# Patient Record
Sex: FEMALE | Race: WHITE | NOT HISPANIC OR LATINO | Employment: UNEMPLOYED | URBAN - NONMETROPOLITAN AREA
[De-identification: names, ages, dates, MRNs, and addresses within clinical notes are randomized per-mention and may not be internally consistent; named-entity substitution may affect disease eponyms.]

---

## 2019-12-16 ENCOUNTER — HISTORICAL (OUTPATIENT)
Dept: ADMINISTRATIVE | Facility: HOSPITAL | Age: 44
End: 2019-12-16

## 2020-09-11 ENCOUNTER — HISTORICAL (OUTPATIENT)
Dept: ADMINISTRATIVE | Facility: HOSPITAL | Age: 45
End: 2020-09-11

## 2020-10-02 ENCOUNTER — HISTORICAL (OUTPATIENT)
Dept: ADMINISTRATIVE | Facility: HOSPITAL | Age: 45
End: 2020-10-02

## 2021-12-01 LAB
ANION GAP SERPL CALC-SCNC: 1 MMOL/L (ref 2–13)
BUN SERPL-MCNC: 16 MG/DL (ref 7–20)
CALCIUM SERPL-MCNC: 9.8 MG/DL (ref 8.4–10.2)
CHLORIDE SERPL-SCNC: 103 MMOL/L (ref 94–110)
CO2 SERPL-SCNC: 32 MMOL/L (ref 21–32)
CREAT SERPL-MCNC: 0.63 MG/DL (ref 0.52–1.04)
CREAT/UREA NIT SERPL: 25.4 (ref 12–20)
EST CREAT CLEARANCE SER (OHS): 93.89 ML/MIN
GLUCOSE SERPL-MCNC: 87 MGM./DL (ref 70–115)
POTASSIUM SERPL-SCNC: 4.3 MMOL/L (ref 3.5–5.1)
SODIUM SERPL-SCNC: 136 MMOL/L (ref 135–145)

## 2022-04-10 ENCOUNTER — HISTORICAL (OUTPATIENT)
Dept: ADMINISTRATIVE | Facility: HOSPITAL | Age: 47
End: 2022-04-10

## 2022-04-28 VITALS
WEIGHT: 115.94 LBS | DIASTOLIC BLOOD PRESSURE: 72 MMHG | SYSTOLIC BLOOD PRESSURE: 124 MMHG | BODY MASS INDEX: 18.2 KG/M2 | OXYGEN SATURATION: 99 % | HEIGHT: 67 IN

## 2022-04-30 ENCOUNTER — HISTORICAL (OUTPATIENT)
Dept: ADMINISTRATIVE | Facility: HOSPITAL | Age: 47
End: 2022-04-30
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
## 2023-02-23 ENCOUNTER — TELEPHONE (OUTPATIENT)
Dept: FAMILY MEDICINE | Facility: CLINIC | Age: 48
End: 2023-02-23
Payer: MEDICAID

## 2023-02-23 DIAGNOSIS — G62.9 NEUROPATHY: Primary | ICD-10-CM

## 2023-02-23 RX ORDER — GABAPENTIN 300 MG/1
300 CAPSULE ORAL 3 TIMES DAILY
Qty: 90 CAPSULE | Refills: 3 | Status: SHIPPED | OUTPATIENT
Start: 2023-02-23 | End: 2024-02-15 | Stop reason: SDUPTHER

## 2023-02-23 RX ORDER — GABAPENTIN 300 MG/1
300 CAPSULE ORAL 3 TIMES DAILY
COMMUNITY
Start: 2022-11-28 | End: 2023-02-23 | Stop reason: SDUPTHER

## 2023-05-24 VITALS
WEIGHT: 115.31 LBS | HEART RATE: 86 BPM | TEMPERATURE: 98 F | BODY MASS INDEX: 18.1 KG/M2 | DIASTOLIC BLOOD PRESSURE: 60 MMHG | SYSTOLIC BLOOD PRESSURE: 110 MMHG | OXYGEN SATURATION: 94 % | HEIGHT: 67 IN

## 2023-05-24 RX ORDER — NAPROXEN 500 MG/1
500 TABLET ORAL 2 TIMES DAILY PRN
COMMUNITY
Start: 2022-05-31 | End: 2023-07-10 | Stop reason: SDUPTHER

## 2023-05-24 RX ORDER — ESCITALOPRAM OXALATE 10 MG/1
10 TABLET ORAL NIGHTLY
COMMUNITY
Start: 2022-12-30 | End: 2023-09-11 | Stop reason: SDUPTHER

## 2023-05-24 RX ORDER — BUSPIRONE HYDROCHLORIDE 5 MG/1
5 TABLET ORAL 2 TIMES DAILY
COMMUNITY
Start: 2022-12-30 | End: 2023-11-07

## 2023-05-24 RX ORDER — TIZANIDINE 4 MG/1
4 TABLET ORAL EVERY 8 HOURS PRN
COMMUNITY
Start: 2022-05-31 | End: 2023-09-11 | Stop reason: SDUPTHER

## 2023-06-20 VITALS
BODY MASS INDEX: 18.1 KG/M2 | OXYGEN SATURATION: 94 % | HEIGHT: 67 IN | TEMPERATURE: 98 F | HEART RATE: 86 BPM | WEIGHT: 115.31 LBS | DIASTOLIC BLOOD PRESSURE: 60 MMHG | SYSTOLIC BLOOD PRESSURE: 110 MMHG

## 2023-07-10 ENCOUNTER — TELEPHONE (OUTPATIENT)
Dept: FAMILY MEDICINE | Facility: CLINIC | Age: 48
End: 2023-07-10
Payer: MEDICAID

## 2023-07-10 DIAGNOSIS — R52 PAIN: Primary | ICD-10-CM

## 2023-07-10 RX ORDER — NAPROXEN 500 MG/1
500 TABLET ORAL 2 TIMES DAILY PRN
Qty: 60 TABLET | Refills: 0 | Status: SHIPPED | OUTPATIENT
Start: 2023-07-10 | End: 2023-08-09 | Stop reason: SDUPTHER

## 2023-07-18 ENCOUNTER — TELEPHONE (OUTPATIENT)
Dept: FAMILY MEDICINE | Facility: CLINIC | Age: 48
End: 2023-07-18
Payer: MEDICAID

## 2023-07-18 NOTE — TELEPHONE ENCOUNTER
----- Message from Josef He sent at 7/18/2023 11:05 AM CDT -----  Regarding: general  Pt was left a vm for labs to be done by Friday at the hospital, prior to appt.

## 2023-08-09 ENCOUNTER — TELEPHONE (OUTPATIENT)
Dept: FAMILY MEDICINE | Facility: CLINIC | Age: 48
End: 2023-08-09
Payer: MEDICAID

## 2023-08-09 DIAGNOSIS — R52 PAIN: ICD-10-CM

## 2023-08-09 RX ORDER — NAPROXEN 500 MG/1
500 TABLET ORAL 2 TIMES DAILY PRN
Qty: 60 TABLET | Refills: 0 | Status: SHIPPED | OUTPATIENT
Start: 2023-08-09 | End: 2023-09-11

## 2023-08-09 NOTE — TELEPHONE ENCOUNTER
----- Message from Antonette Vazquez sent at 8/9/2023  9:48 AM CDT -----  Regarding: refill  Naproxen 500 mg     [facility]         [phone]

## 2023-08-15 ENCOUNTER — TELEPHONE (OUTPATIENT)
Dept: FAMILY MEDICINE | Facility: CLINIC | Age: 48
End: 2023-08-15
Payer: MEDICAID

## 2023-08-15 VITALS
WEIGHT: 115.31 LBS | DIASTOLIC BLOOD PRESSURE: 60 MMHG | TEMPERATURE: 98 F | HEART RATE: 86 BPM | BODY MASS INDEX: 18.1 KG/M2 | OXYGEN SATURATION: 94 % | HEIGHT: 67 IN | SYSTOLIC BLOOD PRESSURE: 110 MMHG

## 2023-08-24 PROBLEM — Z90.710 HISTORY OF HYSTERECTOMY: Status: ACTIVE | Noted: 2023-08-24

## 2023-08-24 PROBLEM — M51.36 DEGENERATION OF INTERVERTEBRAL DISC OF LUMBAR REGION: Status: ACTIVE | Noted: 2023-08-24

## 2023-08-24 PROBLEM — F41.9 ANXIETY: Status: ACTIVE | Noted: 2023-08-24

## 2023-08-24 PROBLEM — N95.1 HOT FLASHES DUE TO MENOPAUSE: Status: ACTIVE | Noted: 2023-08-24

## 2023-08-24 PROBLEM — E78.00 ELEVATED LOW DENSITY LIPOPROTEIN (LDL) CHOLESTEROL LEVEL: Status: ACTIVE | Noted: 2023-08-24

## 2023-08-24 PROBLEM — M51.369 DEGENERATION OF INTERVERTEBRAL DISC OF LUMBAR REGION: Status: ACTIVE | Noted: 2023-08-24

## 2023-08-24 PROBLEM — G62.9 NEUROPATHY: Status: ACTIVE | Noted: 2023-08-24

## 2023-08-24 PROBLEM — Z00.01 ABNORMAL PHYSICAL EVALUATION: Status: ACTIVE | Noted: 2023-08-24

## 2023-09-11 ENCOUNTER — LAB VISIT (OUTPATIENT)
Dept: LAB | Facility: HOSPITAL | Age: 48
End: 2023-09-11
Attending: NURSE PRACTITIONER
Payer: MEDICAID

## 2023-09-11 ENCOUNTER — TELEPHONE (OUTPATIENT)
Dept: FAMILY MEDICINE | Facility: CLINIC | Age: 48
End: 2023-09-11
Payer: MEDICAID

## 2023-09-11 DIAGNOSIS — M62.838 MUSCLE SPASM: ICD-10-CM

## 2023-09-11 DIAGNOSIS — E78.00 ELEVATED LDL CHOLESTEROL LEVEL: ICD-10-CM

## 2023-09-11 DIAGNOSIS — R52 PAIN: ICD-10-CM

## 2023-09-11 DIAGNOSIS — Z00.01 ENCOUNTER FOR WELL ADULT EXAM WITH ABNORMAL FINDINGS: ICD-10-CM

## 2023-09-11 DIAGNOSIS — Z79.899 MEDICATION MANAGEMENT: ICD-10-CM

## 2023-09-11 DIAGNOSIS — F32.A DEPRESSION, UNSPECIFIED DEPRESSION TYPE: Primary | ICD-10-CM

## 2023-09-11 LAB
ALBUMIN SERPL-MCNC: 4.3 G/DL (ref 3.4–5)
ALBUMIN/GLOB SERPL: 1.8 RATIO
ALP SERPL-CCNC: 52 UNIT/L (ref 50–144)
ALT SERPL-CCNC: 17 UNIT/L (ref 1–45)
ANION GAP SERPL CALC-SCNC: 5 MEQ/L (ref 2–13)
AST SERPL-CCNC: 26 UNIT/L (ref 14–36)
BASOPHILS # BLD AUTO: 0.02 X10(3)/MCL (ref 0.01–0.08)
BASOPHILS NFR BLD AUTO: 0.3 % (ref 0.1–1.2)
BILIRUB SERPL-MCNC: 0.5 MG/DL (ref 0–1)
BUN SERPL-MCNC: 17 MG/DL (ref 7–20)
CALCIUM SERPL-MCNC: 9.6 MG/DL (ref 8.4–10.2)
CHLORIDE SERPL-SCNC: 104 MMOL/L (ref 98–110)
CHOLEST SERPL-MCNC: 209 MG/DL (ref 0–200)
CO2 SERPL-SCNC: 28 MMOL/L (ref 21–32)
CREAT SERPL-MCNC: 0.71 MG/DL (ref 0.66–1.25)
CREAT/UREA NIT SERPL: 24 (ref 12–20)
EOSINOPHIL # BLD AUTO: 0.11 X10(3)/MCL (ref 0.04–0.36)
EOSINOPHIL NFR BLD AUTO: 1.7 % (ref 0.7–7)
ERYTHROCYTE [DISTWIDTH] IN BLOOD BY AUTOMATED COUNT: 12.7 % (ref 11–14.5)
EST. AVERAGE GLUCOSE BLD GHB EST-MCNC: 102.5 MG/DL (ref 70–115)
GFR SERPLBLD CREATININE-BSD FMLA CKD-EPI: >90 MLS/MIN/1.73/M2
GLOBULIN SER-MCNC: 2.4 GM/DL (ref 2–3.9)
GLUCOSE SERPL-MCNC: 98 MG/DL (ref 70–115)
HBA1C MFR BLD: 5.2 % (ref 4–6)
HCT VFR BLD AUTO: 46.3 % (ref 36–48)
HDLC SERPL-MCNC: 75 MG/DL (ref 40–60)
HGB BLD-MCNC: 15.7 G/DL (ref 11.8–16)
IMM GRANULOCYTES # BLD AUTO: 0.02 X10(3)/MCL (ref 0–0.03)
IMM GRANULOCYTES NFR BLD AUTO: 0.3 % (ref 0–0.5)
LDLC SERPL DIRECT ASSAY-SCNC: 112.4 MG/DL (ref 30–100)
LYMPHOCYTES # BLD AUTO: 1.86 X10(3)/MCL (ref 1.16–3.74)
LYMPHOCYTES NFR BLD AUTO: 29.2 % (ref 20–55)
MCH RBC QN AUTO: 30.4 PG (ref 27–34)
MCHC RBC AUTO-ENTMCNC: 33.9 G/DL (ref 31–37)
MCV RBC AUTO: 89.7 FL (ref 79–99)
MONOCYTES # BLD AUTO: 0.5 X10(3)/MCL (ref 0.24–0.36)
MONOCYTES NFR BLD AUTO: 7.9 % (ref 4.7–12.5)
NEUTROPHILS # BLD AUTO: 3.85 X10(3)/MCL (ref 1.56–6.13)
NEUTROPHILS NFR BLD AUTO: 60.6 % (ref 37–73)
NRBC BLD AUTO-RTO: 0 %
PLATELET # BLD AUTO: 198 X10(3)/MCL (ref 140–371)
PMV BLD AUTO: 11.5 FL (ref 9.4–12.4)
POTASSIUM SERPL-SCNC: 4.8 MMOL/L (ref 3.5–5.1)
PROT SERPL-MCNC: 6.7 GM/DL (ref 6.3–8.2)
RBC # BLD AUTO: 5.16 X10(6)/MCL (ref 4–5.1)
SODIUM SERPL-SCNC: 137 MMOL/L (ref 135–145)
TRIGL SERPL-MCNC: 63 MG/DL (ref 30–200)
TSH SERPL-ACNC: 2.92 UIU/ML (ref 0.36–3.74)
WBC # SPEC AUTO: 6.36 X10(3)/MCL (ref 4–11.5)

## 2023-09-11 PROCEDURE — 84443 ASSAY THYROID STIM HORMONE: CPT

## 2023-09-11 PROCEDURE — 80053 COMPREHEN METABOLIC PANEL: CPT

## 2023-09-11 PROCEDURE — 36415 COLL VENOUS BLD VENIPUNCTURE: CPT

## 2023-09-11 PROCEDURE — 80061 LIPID PANEL: CPT

## 2023-09-11 PROCEDURE — 85025 COMPLETE CBC W/AUTO DIFF WBC: CPT

## 2023-09-11 PROCEDURE — 83036 HEMOGLOBIN GLYCOSYLATED A1C: CPT

## 2023-09-11 RX ORDER — NAPROXEN 500 MG/1
500 TABLET ORAL 2 TIMES DAILY PRN
Qty: 60 TABLET | Refills: 1 | Status: SHIPPED | OUTPATIENT
Start: 2023-09-11 | End: 2023-10-10 | Stop reason: SDUPTHER

## 2023-09-11 RX ORDER — TIZANIDINE 4 MG/1
4 TABLET ORAL EVERY 8 HOURS PRN
Qty: 90 TABLET | Refills: 1 | Status: SHIPPED | OUTPATIENT
Start: 2023-09-11 | End: 2024-03-14 | Stop reason: SDUPTHER

## 2023-09-11 RX ORDER — ESCITALOPRAM OXALATE 10 MG/1
10 TABLET ORAL NIGHTLY
Qty: 30 TABLET | Refills: 1 | Status: SHIPPED | OUTPATIENT
Start: 2023-09-11 | End: 2024-01-10 | Stop reason: SDUPTHER

## 2023-10-10 ENCOUNTER — TELEPHONE (OUTPATIENT)
Dept: FAMILY MEDICINE | Facility: CLINIC | Age: 48
End: 2023-10-10
Payer: MEDICAID

## 2023-10-10 DIAGNOSIS — R52 PAIN: ICD-10-CM

## 2023-10-10 RX ORDER — NAPROXEN 500 MG/1
500 TABLET ORAL 2 TIMES DAILY PRN
Qty: 60 TABLET | Refills: 0 | Status: SHIPPED | OUTPATIENT
Start: 2023-10-10 | End: 2023-12-11 | Stop reason: SDUPTHER

## 2023-11-07 DIAGNOSIS — F41.9 ANXIETY: Primary | ICD-10-CM

## 2023-11-07 RX ORDER — BUSPIRONE HYDROCHLORIDE 5 MG/1
TABLET ORAL
Qty: 60 TABLET | Refills: 0 | Status: SHIPPED | OUTPATIENT
Start: 2023-11-07 | End: 2023-12-11 | Stop reason: SDUPTHER

## 2023-11-27 PROBLEM — Z00.01 ABNORMAL PHYSICAL EVALUATION: Status: RESOLVED | Noted: 2023-08-24 | Resolved: 2023-11-27

## 2023-12-11 ENCOUNTER — TELEPHONE (OUTPATIENT)
Dept: FAMILY MEDICINE | Facility: CLINIC | Age: 48
End: 2023-12-11
Payer: MEDICAID

## 2023-12-11 DIAGNOSIS — R52 PAIN: ICD-10-CM

## 2023-12-11 DIAGNOSIS — F41.9 ANXIETY: ICD-10-CM

## 2023-12-11 RX ORDER — BUSPIRONE HYDROCHLORIDE 5 MG/1
5 TABLET ORAL 2 TIMES DAILY
Qty: 60 TABLET | Refills: 0 | Status: SHIPPED | OUTPATIENT
Start: 2023-12-11 | End: 2024-01-10 | Stop reason: SDUPTHER

## 2023-12-11 RX ORDER — NAPROXEN 500 MG/1
500 TABLET ORAL 2 TIMES DAILY PRN
Qty: 60 TABLET | Refills: 0 | Status: SHIPPED | OUTPATIENT
Start: 2023-12-11 | End: 2023-12-12 | Stop reason: SDUPTHER

## 2023-12-12 RX ORDER — NAPROXEN 500 MG/1
500 TABLET ORAL 2 TIMES DAILY PRN
Qty: 60 TABLET | Refills: 0 | Status: SHIPPED | OUTPATIENT
Start: 2023-12-12 | End: 2024-01-10 | Stop reason: SDUPTHER

## 2023-12-12 NOTE — TELEPHONE ENCOUNTER
Patient rescheduled her wellness for January. I have written for the following medications and/or orders for the patient.  Please notify the patient.        Medications Ordered This Encounter   Medications    naproxen (NAPROSYN) 500 MG tablet     Sig: Take 1 tablet (500 mg total) by mouth 2 (two) times daily as needed (pain).     Dispense:  60 tablet     Refill:  0

## 2024-01-10 DIAGNOSIS — F41.9 ANXIETY: ICD-10-CM

## 2024-01-10 DIAGNOSIS — R52 PAIN: ICD-10-CM

## 2024-01-10 DIAGNOSIS — F32.A DEPRESSION, UNSPECIFIED DEPRESSION TYPE: ICD-10-CM

## 2024-01-10 RX ORDER — ESCITALOPRAM OXALATE 10 MG/1
10 TABLET ORAL NIGHTLY
Qty: 30 TABLET | Refills: 0 | Status: SHIPPED | OUTPATIENT
Start: 2024-01-10 | End: 2024-02-15 | Stop reason: SDUPTHER

## 2024-01-10 RX ORDER — NAPROXEN 500 MG/1
500 TABLET ORAL 2 TIMES DAILY PRN
Qty: 60 TABLET | Refills: 0 | Status: SHIPPED | OUTPATIENT
Start: 2024-01-10 | End: 2024-02-15 | Stop reason: SDUPTHER

## 2024-01-10 RX ORDER — BUSPIRONE HYDROCHLORIDE 5 MG/1
5 TABLET ORAL 2 TIMES DAILY
Qty: 60 TABLET | Refills: 0 | Status: SHIPPED | OUTPATIENT
Start: 2024-01-10 | End: 2024-02-15 | Stop reason: SDUPTHER

## 2024-02-14 ENCOUNTER — TELEPHONE (OUTPATIENT)
Dept: FAMILY MEDICINE | Facility: CLINIC | Age: 49
End: 2024-02-14
Payer: MEDICAID

## 2024-02-14 DIAGNOSIS — G62.9 NEUROPATHY: ICD-10-CM

## 2024-02-14 DIAGNOSIS — R52 PAIN: ICD-10-CM

## 2024-02-14 DIAGNOSIS — F32.A DEPRESSION, UNSPECIFIED DEPRESSION TYPE: ICD-10-CM

## 2024-02-14 DIAGNOSIS — F41.9 ANXIETY: ICD-10-CM

## 2024-02-14 NOTE — TELEPHONE ENCOUNTER
Before meds can be sent she no showed her wellness on 1/24/24. She needs to be rescheduled with labs prior

## 2024-02-15 RX ORDER — BUSPIRONE HYDROCHLORIDE 5 MG/1
5 TABLET ORAL 2 TIMES DAILY
Qty: 60 TABLET | Refills: 0 | Status: SHIPPED | OUTPATIENT
Start: 2024-02-15 | End: 2024-03-14 | Stop reason: SDUPTHER

## 2024-02-15 RX ORDER — GABAPENTIN 300 MG/1
300 CAPSULE ORAL 3 TIMES DAILY
Qty: 90 CAPSULE | Refills: 0 | Status: SHIPPED | OUTPATIENT
Start: 2024-02-15 | End: 2024-03-14 | Stop reason: SDUPTHER

## 2024-02-15 RX ORDER — ESCITALOPRAM OXALATE 10 MG/1
10 TABLET ORAL NIGHTLY
Qty: 30 TABLET | Refills: 0 | Status: SHIPPED | OUTPATIENT
Start: 2024-02-15 | End: 2024-03-14 | Stop reason: SDUPTHER

## 2024-02-15 RX ORDER — NAPROXEN 500 MG/1
500 TABLET ORAL 2 TIMES DAILY PRN
Qty: 60 TABLET | Refills: 0 | Status: SHIPPED | OUTPATIENT
Start: 2024-02-15 | End: 2024-03-14 | Stop reason: SDUPTHER

## 2024-03-11 ENCOUNTER — LAB VISIT (OUTPATIENT)
Dept: LAB | Facility: HOSPITAL | Age: 49
End: 2024-03-11
Attending: NURSE PRACTITIONER
Payer: MEDICAID

## 2024-03-11 DIAGNOSIS — Z00.01 ENCOUNTER FOR WELL ADULT EXAM WITH ABNORMAL FINDINGS: ICD-10-CM

## 2024-03-11 DIAGNOSIS — Z79.899 MEDICATION MANAGEMENT: ICD-10-CM

## 2024-03-11 DIAGNOSIS — E78.00 ELEVATED LDL CHOLESTEROL LEVEL: ICD-10-CM

## 2024-03-11 LAB
ALBUMIN SERPL-MCNC: 4.4 G/DL (ref 3.4–5)
ALBUMIN/GLOB SERPL: 1.7 RATIO
ALP SERPL-CCNC: 79 UNIT/L (ref 50–144)
ALT SERPL-CCNC: 18 UNIT/L (ref 1–45)
ANION GAP SERPL CALC-SCNC: 3 MEQ/L (ref 2–13)
AST SERPL-CCNC: 29 UNIT/L (ref 14–36)
BASOPHILS # BLD AUTO: 0.04 X10(3)/MCL (ref 0.01–0.08)
BASOPHILS NFR BLD AUTO: 0.6 % (ref 0.1–1.2)
BILIRUB SERPL-MCNC: 0.6 MG/DL (ref 0–1)
BUN SERPL-MCNC: 18 MG/DL (ref 7–20)
CALCIUM SERPL-MCNC: 9.9 MG/DL (ref 8.4–10.2)
CHLORIDE SERPL-SCNC: 108 MMOL/L (ref 98–110)
CHOLEST SERPL-MCNC: 227 MG/DL (ref 0–200)
CO2 SERPL-SCNC: 29 MMOL/L (ref 21–32)
CREAT SERPL-MCNC: 0.8 MG/DL (ref 0.66–1.25)
CREAT/UREA NIT SERPL: 23 (ref 12–20)
EOSINOPHIL # BLD AUTO: 0.08 X10(3)/MCL (ref 0.04–0.36)
EOSINOPHIL NFR BLD AUTO: 1.2 % (ref 0.7–7)
ERYTHROCYTE [DISTWIDTH] IN BLOOD BY AUTOMATED COUNT: 13.2 % (ref 11–14.5)
EST. AVERAGE GLUCOSE BLD GHB EST-MCNC: 99.7 MG/DL (ref 70–115)
GFR SERPLBLD CREATININE-BSD FMLA CKD-EPI: 90 MLS/MIN/1.73/M2
GLOBULIN SER-MCNC: 2.6 GM/DL (ref 2–3.9)
GLUCOSE SERPL-MCNC: 94 MG/DL (ref 70–115)
HBA1C MFR BLD: 5.1 % (ref 4–6)
HCT VFR BLD AUTO: 44.4 % (ref 36–48)
HDLC SERPL-MCNC: 71 MG/DL (ref 40–60)
HGB BLD-MCNC: 15.2 G/DL (ref 11.8–16)
IMM GRANULOCYTES # BLD AUTO: 0.04 X10(3)/MCL (ref 0–0.03)
IMM GRANULOCYTES NFR BLD AUTO: 0.6 % (ref 0–0.5)
LDLC SERPL DIRECT ASSAY-SCNC: 123.3 MG/DL (ref 30–100)
LYMPHOCYTES # BLD AUTO: 1.47 X10(3)/MCL (ref 1.16–3.74)
LYMPHOCYTES NFR BLD AUTO: 22.3 % (ref 20–55)
MCH RBC QN AUTO: 31.4 PG (ref 27–34)
MCHC RBC AUTO-ENTMCNC: 34.2 G/DL (ref 31–37)
MCV RBC AUTO: 91.7 FL (ref 79–99)
MONOCYTES # BLD AUTO: 0.45 X10(3)/MCL (ref 0.24–0.36)
MONOCYTES NFR BLD AUTO: 6.8 % (ref 4.7–12.5)
NEUTROPHILS # BLD AUTO: 4.51 X10(3)/MCL (ref 1.56–6.13)
NEUTROPHILS NFR BLD AUTO: 68.5 % (ref 37–73)
NRBC BLD AUTO-RTO: 0 %
PLATELET # BLD AUTO: 167 X10(3)/MCL (ref 140–371)
PMV BLD AUTO: 11.8 FL (ref 9.4–12.4)
POTASSIUM SERPL-SCNC: 4.8 MMOL/L (ref 3.5–5.1)
PROT SERPL-MCNC: 7 GM/DL (ref 6.3–8.2)
RBC # BLD AUTO: 4.84 X10(6)/MCL (ref 4–5.1)
SODIUM SERPL-SCNC: 140 MMOL/L (ref 135–145)
TRIGL SERPL-MCNC: 50 MG/DL (ref 30–200)
TSH SERPL-ACNC: 2.19 UIU/ML (ref 0.36–3.74)
WBC # SPEC AUTO: 6.59 X10(3)/MCL (ref 4–11.5)

## 2024-03-11 PROCEDURE — 83036 HEMOGLOBIN GLYCOSYLATED A1C: CPT

## 2024-03-11 PROCEDURE — 84443 ASSAY THYROID STIM HORMONE: CPT

## 2024-03-11 PROCEDURE — 80053 COMPREHEN METABOLIC PANEL: CPT

## 2024-03-11 PROCEDURE — 36415 COLL VENOUS BLD VENIPUNCTURE: CPT

## 2024-03-11 PROCEDURE — 85025 COMPLETE CBC W/AUTO DIFF WBC: CPT

## 2024-03-11 PROCEDURE — 80061 LIPID PANEL: CPT

## 2024-03-14 ENCOUNTER — OFFICE VISIT (OUTPATIENT)
Dept: FAMILY MEDICINE | Facility: CLINIC | Age: 49
End: 2024-03-14
Payer: MEDICAID

## 2024-03-14 VITALS
TEMPERATURE: 97 F | WEIGHT: 131.81 LBS | DIASTOLIC BLOOD PRESSURE: 74 MMHG | HEART RATE: 67 BPM | SYSTOLIC BLOOD PRESSURE: 110 MMHG | HEIGHT: 67 IN | OXYGEN SATURATION: 97 % | BODY MASS INDEX: 20.69 KG/M2

## 2024-03-14 DIAGNOSIS — Z72.0 TOBACCO USER: ICD-10-CM

## 2024-03-14 DIAGNOSIS — Z00.00 ENCOUNTER FOR WELLNESS EXAMINATION IN ADULT: Primary | ICD-10-CM

## 2024-03-14 DIAGNOSIS — M51.36 DEGENERATION OF INTERVERTEBRAL DISC OF LUMBAR REGION: ICD-10-CM

## 2024-03-14 DIAGNOSIS — Z23 IMMUNIZATION DUE: ICD-10-CM

## 2024-03-14 DIAGNOSIS — F41.9 ANXIETY: ICD-10-CM

## 2024-03-14 DIAGNOSIS — Z12.11 COLON CANCER SCREENING: ICD-10-CM

## 2024-03-14 DIAGNOSIS — E78.49 OTHER HYPERLIPIDEMIA: ICD-10-CM

## 2024-03-14 DIAGNOSIS — Z12.31 SCREENING MAMMOGRAM FOR BREAST CANCER: ICD-10-CM

## 2024-03-14 PROBLEM — F32.A DEPRESSION: Status: ACTIVE | Noted: 2024-03-14

## 2024-03-14 PROBLEM — R52 PAIN: Status: ACTIVE | Noted: 2024-03-14

## 2024-03-14 PROBLEM — M62.838 MUSCLE SPASM: Status: ACTIVE | Noted: 2024-03-14

## 2024-03-14 PROCEDURE — 99396 PREV VISIT EST AGE 40-64: CPT | Mod: 25,,, | Performed by: NURSE PRACTITIONER

## 2024-03-14 PROCEDURE — 90686 IIV4 VACC NO PRSV 0.5 ML IM: CPT | Mod: ,,, | Performed by: NURSE PRACTITIONER

## 2024-03-14 PROCEDURE — 3074F SYST BP LT 130 MM HG: CPT | Mod: CPTII,,, | Performed by: NURSE PRACTITIONER

## 2024-03-14 PROCEDURE — 3008F BODY MASS INDEX DOCD: CPT | Mod: CPTII,,, | Performed by: NURSE PRACTITIONER

## 2024-03-14 PROCEDURE — 3078F DIAST BP <80 MM HG: CPT | Mod: CPTII,,, | Performed by: NURSE PRACTITIONER

## 2024-03-14 PROCEDURE — 3044F HG A1C LEVEL LT 7.0%: CPT | Mod: CPTII,,, | Performed by: NURSE PRACTITIONER

## 2024-03-14 PROCEDURE — 1159F MED LIST DOCD IN RCRD: CPT | Mod: CPTII,,, | Performed by: NURSE PRACTITIONER

## 2024-03-14 PROCEDURE — 90471 IMMUNIZATION ADMIN: CPT | Mod: ,,, | Performed by: NURSE PRACTITIONER

## 2024-03-14 PROCEDURE — 1160F RVW MEDS BY RX/DR IN RCRD: CPT | Mod: CPTII,,, | Performed by: NURSE PRACTITIONER

## 2024-03-14 RX ORDER — TIZANIDINE 4 MG/1
4 TABLET ORAL EVERY 8 HOURS PRN
Qty: 30 TABLET | Refills: 1 | Status: SHIPPED | OUTPATIENT
Start: 2024-03-14 | End: 2024-05-15 | Stop reason: SDUPTHER

## 2024-03-14 RX ORDER — ESCITALOPRAM OXALATE 10 MG/1
10 TABLET ORAL NIGHTLY
Qty: 30 TABLET | Refills: 11 | Status: SHIPPED | OUTPATIENT
Start: 2024-03-14

## 2024-03-14 RX ORDER — NAPROXEN 500 MG/1
500 TABLET ORAL 2 TIMES DAILY PRN
Qty: 60 TABLET | Refills: 11 | Status: SHIPPED | OUTPATIENT
Start: 2024-03-14

## 2024-03-14 RX ORDER — GABAPENTIN 300 MG/1
300 CAPSULE ORAL 3 TIMES DAILY
Qty: 90 CAPSULE | Refills: 5 | Status: SHIPPED | OUTPATIENT
Start: 2024-03-14

## 2024-03-14 RX ORDER — BUSPIRONE HYDROCHLORIDE 5 MG/1
5 TABLET ORAL 2 TIMES DAILY
Qty: 60 TABLET | Refills: 11 | Status: SHIPPED | OUTPATIENT
Start: 2024-03-14

## 2024-03-14 NOTE — ASSESSMENT & PLAN NOTE
The 10-year ASCVD risk score (Karime MCKEON, et al., 2019) is: 2.1%    Values used to calculate the score:      Age: 49 years      Sex: Female      Is Non- : No      Diabetic: No      Tobacco smoker: Yes      Systolic Blood Pressure: 110 mmHg      Is BP treated: No      HDL Cholesterol: 71 mg/dL      Total Cholesterol: 227 mg/dL    The lipid level is currently abnormal. I would like to ask you to start using a low cholesterol diet, increase exercise and strive for weight loss. We will recheck these levels in one year.  Please refer to the following guidelines:    Cholesterol is a naturally occurring fatty substance that has both good and bad effects on the body.  On the good side,  it builds natural hormones and helps build and maintain nerve cells.  When your body has too much cholesterol, blood vessel walls can thicken and reduce circulation contributing to heart attacks and strokes. Most of the cholesterol in your blood is made by your liver from the fats, carbohydrates, and proteins  that you eat. Your provider may also check the amount of LDL (low-density lipoprotein) and HDL (high-density lipoprotein) in your blood.  LDLs carry a lot of cholesterol, leave behind fatty deposits on your artery walls, and contribute to heart disease.  HDLs do the opposite.  They clean the artery walls and remove extra cholesterol from the body, thus lowering the risk of heart disease.  LDL is sometimes called bad cholesterol and HDL good cholesterol.  It is desirable to have low levels of LDL and high levels of HDL.  (Smoking lowers HDL levels.)     Levels:   Total Cholesterol   ----------------------------------------   200 or below       good    200 to 239         borderline high    240 or above       high   ----------------------------------------    LDL Cholesterol   ---------------------------------------  130 or below       good   130 to 159         borderline high   160 or above       high    ---------------------------------------    For HDL, a level of 35 mg/dL or below is too low.  The recommended HDL level is 45 mg/dL or higher.        Controlling Your Cholesterol Level   Cholesterol levels can usually be controlled by eating right and exercising.    Follow these diet guidelines to help control your cholesterol:   · Adjust the amount of calories you eat to maintain a lean body weight.    · Reduce the amount of fat you eat.  Fats should contribute no more than 30% of your daily calories and only 10% of the fat you eat should be saturated fat.    To control the amount of fat and cholesterol you eat:   · Check food labels for fat and cholesterol content.    · Limit the amount of butter and margarine you eat.    · Remove the skin before you cook chicken or turkey.    · Drink skim milk.    · Use sunflower, safflower, soybean, canola, or olive oil rather than tropical oils such as palm or coconut.    · Choose lean cuts of meat.    · Eat lean chicken, turkey, and fish instead of red meat.    · Use salad dressings and margarine made with monounsaturated fats.    · Eat fruits, vegetables, beans, and whole grains daily.  The fiber in these foods helps lower cholesterol.    · Use egg whites rather than whole eggs.    · Use low-fat yogurt or low-fat cottage cheese instead of sour cream.      Exercise goes hand-in-hand with good nutrition for controlling cholesterol.  Exercise not only helps you keep your weight down, but also can increase your HDL (good cholesterol) level.    A good exercise program includes aerobic exercise.  Aerobic exercise is any activity that keeps your heart rate up (such as swimming, jogging, walking, and bicycling).  You should get 20 to 30 minutes of aerobic exercise every other day.  Start your new exercise program slowly.  Make sure to stretch before and after you exercise.

## 2024-03-14 NOTE — PROGRESS NOTES
Patient ID: Jazmine Donovan YOB: 1975    Chief Complaint: Annual Exam    Allergies: Patient has No Known Allergies.     History of Present Illness:  The patient is a 49 y.o. White female who presents to clinic for annual wellness visit.    Diet and nutrition:  Diet is high in salt, high in fat, low in fiber, high caloric intake, high carbohydrate meals, high calcium intake.    Fracture risk: No history of fracture, no recent unexplained fracture.    Physical activity:  Does not exercise on a regular basis, good physical condition.    Depression risks:  + history of depression, never feel sad, empty, or tearful, no sleep disturbances, no agitation, no loss of energy, no feelings of worthlessness or guilt, no thoughts of suicide.    Orientation:  No disorientation to time, no disorientation to place.    Concentration and memory:  No decreased concentration ability, no memory lapses or loss, does not forget words.    Speech forward/motor difficulties: No speech difficulties, no difficulty expressing formulated concepts, no difficulty with fine manipulative tasks, no difficulty writing forward/copying, no slowed reaction time, does not knock things over when trying to pick them up.    Fall risk assessment:  No frequent falls while walking, no fall in the past year, no dizziness forward/vertigo, no fear of falling.  Hearing:  No loss of hearing, does not wear hearing aids.    Vision:  No vision problems, does wear glasses.    Activity of daily living: Able to bathe with limited or no assistance, able to control urination and bowels, able to dress with limited or no assistance, able to feed self with limited or no assistance, able to get out of chair or bed with limited or no assistance, able to [place] with limited or no assistance, able to toilet with limited are no assistance.    Activities of daily living:  Able to do housework with limited or no assistance, able to grocery shop with limited or no assistance,  able to manage medications with limited or no assistance, able to manage money with limited or no assistance, able to prepare meals with limited or no assistance, able to use the phone with limited or no assistance.    Screenings: due for vaccinations, due for breast cancer screening, not due for cervical cancer screening, due for colorectal cancer screening.             Past Medical History:  has a past medical history of Degeneration of lumbar intervertebral disc, Elevated LDL cholesterol level, Hot flash, menopausal, Impacted cerumen, bilateral, and Neuropathy.    Surgical History:  has a past surgical history that includes Hysterectomy; Tubal ligation; and dilation and currettage.    Family History: family history includes Coronary artery disease in her father; Diabetes type II in her father; Hypertension in her father.    Social History:  reports that she has been smoking cigarettes. She has never used smokeless tobacco.    Care Team: Patient Care Team:  Nino Guthrie APRN as PCP - General (Family Medicine)     Current Medications:  Current Outpatient Medications   Medication Instructions    busPIRone (BUSPAR) 5 mg, Oral, 2 times daily    EScitalopram oxalate (LEXAPRO) 10 mg, Oral, Nightly    gabapentin (NEURONTIN) 300 mg, Oral, 3 times daily    naproxen (NAPROSYN) 500 mg, Oral, 2 times daily PRN    tiZANidine (ZANAFLEX) 4 mg, Oral, Every 8 hours PRN       Review of Systems   Constitutional:  Negative for appetite change, fatigue, fever and unexpected weight change.   HENT:  Negative for nasal congestion, ear pain, facial swelling, hearing loss, mouth sores, nosebleeds, sore throat and trouble swallowing.    Eyes:  Negative for pain, discharge, redness and visual disturbance.   Respiratory:  Negative for cough, chest tightness and shortness of breath.    Cardiovascular:  Negative for chest pain, palpitations and leg swelling.   Gastrointestinal:  Negative for abdominal pain, blood in stool, constipation,  "diarrhea and nausea.   Endocrine: Negative for cold intolerance, heat intolerance, polydipsia, polyphagia and polyuria.   Genitourinary:  Negative for difficulty urinating, dysuria, frequency and hematuria.   Musculoskeletal:  Positive for arthralgias and back pain. Negative for joint swelling and joint deformity.   Integumentary:  Negative for color change, rash and mole/lesion.   Neurological:  Negative for dizziness, weakness, headaches and memory loss.   Hematological:  Negative for adenopathy. Does not bruise/bleed easily.   Psychiatric/Behavioral:  Negative for confusion, sleep disturbance and suicidal ideas.         Visit Vitals  /74 (BP Location: Right arm, Patient Position: Sitting, BP Method: Medium (Manual))   Pulse 67   Temp 97.2 °F (36.2 °C) (Temporal)   Ht 5' 6.93" (1.7 m)   Wt 59.8 kg (131 lb 12.8 oz)   SpO2 97%   BMI 20.69 kg/m²       Physical Exam  Vitals reviewed.   Constitutional:       General: She is not in acute distress.     Appearance: Normal appearance.   HENT:      Head: Normocephalic and atraumatic.      Right Ear: Tympanic membrane, ear canal and external ear normal.      Left Ear: Tympanic membrane, ear canal and external ear normal.      Nose: Nose normal. No congestion.      Mouth/Throat:      Mouth: Mucous membranes are moist.      Pharynx: Oropharynx is clear. No oropharyngeal exudate or posterior oropharyngeal erythema.   Eyes:      Conjunctiva/sclera: Conjunctivae normal.   Cardiovascular:      Rate and Rhythm: Normal rate and regular rhythm.      Pulses: Normal pulses.      Heart sounds: No murmur heard.  Pulmonary:      Effort: Pulmonary effort is normal.      Breath sounds: Normal breath sounds.   Abdominal:      General: Bowel sounds are normal.      Palpations: Abdomen is soft.      Tenderness: There is no abdominal tenderness.   Musculoskeletal:         General: No swelling or deformity.      Cervical back: Neck supple.   Lymphadenopathy:      Cervical: No cervical " adenopathy.   Skin:     General: Skin is warm and dry.      Coloration: Skin is not jaundiced.      Findings: No rash.   Neurological:      Mental Status: She is alert and oriented to person, place, and time.      Cranial Nerves: No cranial nerve deficit.   Psychiatric:         Mood and Affect: Mood normal.         Behavior: Behavior normal.          Labs Reviewed:  Chemistry:  Lab Results   Component Value Date     03/11/2024    K 4.8 03/11/2024    CHLORIDE 108 03/11/2024    BUN 18.0 03/11/2024    CREATININE 0.80 03/11/2024    EGFRNORACEVR 90 03/11/2024    GLUCOSE 94 03/11/2024    CALCIUM 9.9 03/11/2024    ALKPHOS 79 03/11/2024    LABPROT 7.0 03/11/2024    ALBUMIN 4.4 03/11/2024    AST 29 03/11/2024    ALT 18 03/11/2024    TSH 2.190 03/11/2024        Lab Results   Component Value Date    HGBA1C 5.1 03/11/2024        Hematology:  Lab Results   Component Value Date    WBC 6.59 03/11/2024    RBC 4.84 03/11/2024    HGB 15.2 03/11/2024    HCT 44.4 03/11/2024    MCV 91.7 03/11/2024    MCH 31.4 03/11/2024    MCHC 34.2 03/11/2024    RDW 13.2 03/11/2024     03/11/2024    MPV 11.8 03/11/2024       Lipid Panel:  Lab Results   Component Value Date    CHOL 227 (H) 03/11/2024    HDL 71 (H) 03/11/2024    DLDL 123.3 (H) 03/11/2024    TRIG 50 03/11/2024        Assessment & Plan:  1. Encounter for wellness examination in adult  Overview:  Cervical Cancer Screening- hysterectomy  Breast Cancer Screening- mammogram ordered  Osteoporosis Screening- not due  Colon Cancer Screening -  cologard ordered  Eye Exam- Recommend annually. Refer  Dental Exam- Recommend biannually.        Orders:  -     Ambulatory referral/consult to Optometry; Future; Expected date: 03/21/2024    2. Other hyperlipidemia  Assessment & Plan:  The 10-year ASCVD risk score (Karime MCKEON, et al., 2019) is: 2.1%    Values used to calculate the score:      Age: 49 years      Sex: Female      Is Non- : No      Diabetic: No      Tobacco  smoker: Yes      Systolic Blood Pressure: 110 mmHg      Is BP treated: No      HDL Cholesterol: 71 mg/dL      Total Cholesterol: 227 mg/dL    The lipid level is currently abnormal. I would like to ask you to start using a low cholesterol diet, increase exercise and strive for weight loss. We will recheck these levels in one year.  Please refer to the following guidelines:    Cholesterol is a naturally occurring fatty substance that has both good and bad effects on the body.  On the good side,  it builds natural hormones and helps build and maintain nerve cells.  When your body has too much cholesterol, blood vessel walls can thicken and reduce circulation contributing to heart attacks and strokes. Most of the cholesterol in your blood is made by your liver from the fats, carbohydrates, and proteins  that you eat. Your provider may also check the amount of LDL (low-density lipoprotein) and HDL (high-density lipoprotein) in your blood.  LDLs carry a lot of cholesterol, leave behind fatty deposits on your artery walls, and contribute to heart disease.  HDLs do the opposite.  They clean the artery walls and remove extra cholesterol from the body, thus lowering the risk of heart disease.  LDL is sometimes called bad cholesterol and HDL good cholesterol.  It is desirable to have low levels of LDL and high levels of HDL.  (Smoking lowers HDL levels.)     Levels:   Total Cholesterol   ----------------------------------------   200 or below       good    200 to 239         borderline high    240 or above       high   ----------------------------------------    LDL Cholesterol   ---------------------------------------  130 or below       good   130 to 159         borderline high   160 or above       high   ---------------------------------------    For HDL, a level of 35 mg/dL or below is too low.  The recommended HDL level is 45 mg/dL or higher.        Controlling Your Cholesterol Level   Cholesterol levels can usually be  controlled by eating right and exercising.    Follow these diet guidelines to help control your cholesterol:   · Adjust the amount of calories you eat to maintain a lean body weight.    · Reduce the amount of fat you eat.  Fats should contribute no more than 30% of your daily calories and only 10% of the fat you eat should be saturated fat.    To control the amount of fat and cholesterol you eat:   · Check food labels for fat and cholesterol content.    · Limit the amount of butter and margarine you eat.    · Remove the skin before you cook chicken or turkey.    · Drink skim milk.    · Use sunflower, safflower, soybean, canola, or olive oil rather than tropical oils such as palm or coconut.    · Choose lean cuts of meat.    · Eat lean chicken, turkey, and fish instead of red meat.    · Use salad dressings and margarine made with monounsaturated fats.    · Eat fruits, vegetables, beans, and whole grains daily.  The fiber in these foods helps lower cholesterol.    · Use egg whites rather than whole eggs.    · Use low-fat yogurt or low-fat cottage cheese instead of sour cream.      Exercise goes hand-in-hand with good nutrition for controlling cholesterol.  Exercise not only helps you keep your weight down, but also can increase your HDL (good cholesterol) level.    A good exercise program includes aerobic exercise.  Aerobic exercise is any activity that keeps your heart rate up (such as swimming, jogging, walking, and bicycling).  You should get 20 to 30 minutes of aerobic exercise every other day.  Start your new exercise program slowly.  Make sure to stretch before and after you exercise.            3. Anxiety  Overview:  Mood stable with lexapro and prn buspar    Orders:  -     busPIRone (BUSPAR) 5 MG Tab; Take 1 tablet (5 mg total) by mouth 2 (two) times daily.  Dispense: 60 tablet; Refill: 11  -     EScitalopram oxalate (LEXAPRO) 10 MG tablet; Take 1 tablet (10 mg total) by mouth every evening.  Dispense: 30  tablet; Refill: 11    4. Degeneration of intervertebral disc of lumbar region  Overview:  Improved with naproxen, gabapentin and prn tizanidine    Orders:  -     gabapentin (NEURONTIN) 300 MG capsule; Take 1 capsule (300 mg total) by mouth 3 (three) times daily.  Dispense: 90 capsule; Refill: 5  -     naproxen (NAPROSYN) 500 MG tablet; Take 1 tablet (500 mg total) by mouth 2 (two) times daily as needed (pain).  Dispense: 60 tablet; Refill: 11  -     tiZANidine (ZANAFLEX) 4 MG tablet; Take 1 tablet (4 mg total) by mouth every 8 (eight) hours as needed (muscle pain).  Dispense: 30 tablet; Refill: 1    5. Tobacco user  Assessment & Plan:  Declines smoking cessation      6. Colon cancer screening  -     Cologuard Screening (Multitarget Stool DNA); Future; Expected date: 03/14/2024    7. Screening mammogram for breast cancer  -     Mammo Digital Screening Bilat w/ Stan; Future; Expected date: 03/14/2024           Vaccinations:  Immunization History   Administered Date(s) Administered    Influenza - Quadrivalent - PF *Preferred* (6 months and older) 12/01/2021    Td (ADULT) 02/06/2007, 06/17/2011       No future appointments.    Follow up for 1), 6 mo f/u, Chol, Fasting labs prior, 2), 1 year, Wellness, fasting labs prior. Call sooner if needed.    ANDRÉS BASURTO    Lab Frequency Next Occurrence   Mammo Digital Screening Bilat w/ Stan Once 03/14/2024   Ambulatory referral/consult to Optometry Once 03/21/2024   Comprehensive Metabolic Panel Once 09/14/2024   Lipid Panel Once 09/14/2024   CBC Auto Differential Once 03/14/2025   Comprehensive Metabolic Panel Once 03/14/2025   Lipid Panel Once 03/14/2025   TSH Once 03/14/2025   Hemoglobin A1C Once 03/14/2025

## 2024-03-18 ENCOUNTER — PATIENT MESSAGE (OUTPATIENT)
Dept: FAMILY MEDICINE | Facility: CLINIC | Age: 49
End: 2024-03-18
Payer: MEDICAID

## 2024-05-13 ENCOUNTER — TELEPHONE (OUTPATIENT)
Dept: FAMILY MEDICINE | Facility: CLINIC | Age: 49
End: 2024-05-13
Payer: MEDICAID

## 2024-05-15 ENCOUNTER — TELEPHONE (OUTPATIENT)
Dept: FAMILY MEDICINE | Facility: CLINIC | Age: 49
End: 2024-05-15
Payer: MEDICAID

## 2024-05-15 DIAGNOSIS — M51.36 DEGENERATION OF INTERVERTEBRAL DISC OF LUMBAR REGION: ICD-10-CM

## 2024-05-15 RX ORDER — TIZANIDINE 4 MG/1
4 TABLET ORAL EVERY 8 HOURS PRN
Qty: 30 TABLET | Refills: 4 | Status: SHIPPED | OUTPATIENT
Start: 2024-05-15

## 2024-06-17 PROBLEM — Z00.00 ENCOUNTER FOR WELLNESS EXAMINATION IN ADULT: Status: RESOLVED | Noted: 2024-03-14 | Resolved: 2024-06-17

## 2024-07-01 ENCOUNTER — TELEPHONE (OUTPATIENT)
Dept: OBSTETRICS AND GYNECOLOGY | Facility: CLINIC | Age: 49
End: 2024-07-01

## 2024-07-01 ENCOUNTER — OFFICE VISIT (OUTPATIENT)
Dept: OBSTETRICS AND GYNECOLOGY | Facility: CLINIC | Age: 49
End: 2024-07-01
Payer: MEDICAID

## 2024-07-01 VITALS
SYSTOLIC BLOOD PRESSURE: 120 MMHG | TEMPERATURE: 97 F | HEIGHT: 67 IN | BODY MASS INDEX: 19.82 KG/M2 | WEIGHT: 126.31 LBS | DIASTOLIC BLOOD PRESSURE: 74 MMHG

## 2024-07-01 DIAGNOSIS — L02.214 INGUINAL ABSCESS: Primary | ICD-10-CM

## 2024-07-01 PROCEDURE — 1159F MED LIST DOCD IN RCRD: CPT | Mod: CPTII,,, | Performed by: NURSE PRACTITIONER

## 2024-07-01 PROCEDURE — 3078F DIAST BP <80 MM HG: CPT | Mod: CPTII,,, | Performed by: NURSE PRACTITIONER

## 2024-07-01 PROCEDURE — 3008F BODY MASS INDEX DOCD: CPT | Mod: CPTII,,, | Performed by: NURSE PRACTITIONER

## 2024-07-01 PROCEDURE — 99213 OFFICE O/P EST LOW 20 MIN: CPT | Mod: ,,, | Performed by: NURSE PRACTITIONER

## 2024-07-01 PROCEDURE — 1160F RVW MEDS BY RX/DR IN RCRD: CPT | Mod: CPTII,,, | Performed by: NURSE PRACTITIONER

## 2024-07-01 PROCEDURE — 3074F SYST BP LT 130 MM HG: CPT | Mod: CPTII,,, | Performed by: NURSE PRACTITIONER

## 2024-07-01 PROCEDURE — 3044F HG A1C LEVEL LT 7.0%: CPT | Mod: CPTII,,, | Performed by: NURSE PRACTITIONER

## 2024-07-01 RX ORDER — CEPHALEXIN 500 MG/1
500 CAPSULE ORAL 4 TIMES DAILY
Qty: 28 CAPSULE | Refills: 0 | Status: SHIPPED | OUTPATIENT
Start: 2024-07-01 | End: 2024-07-08

## 2024-07-01 NOTE — TELEPHONE ENCOUNTER
Patient called c/o of 2 pin size boils to her panty line x 2 days.  +yellow drainage noted when squeezing area.  +discomfort to area.  Last seen 2022 with same problem.  Spoke with Yamileth Luna NP regarding patient's concerns due to Dr. Sawant out for vacation.  BLAKE Luna NP ordered patient to come in today.  She will need to schedule an annual exam with Dr. Sawant before leaving today.  Patient voiced understanding and agrees with plan of care.

## 2024-07-01 NOTE — PROGRESS NOTES
Chief Complaint:  Vaginal Boil    History of Present Illness:  Patient is a 49 y.o.  presents c/o a vaginal boil to her panty line noted last PM, reports yellowish drainage last night.+ Painful. Similar episode in , treated w/ I&D and antibiotics.       Gyn History:  Menstrual History   Cycle: No  Menarche Age: 1 years  No Cycle Reason: (!) Surgical  Surgical Reason: hysterectomy  [place]  Sexually Active: No  STI History: No  Contraception: No  Menopause  Menopause Age: 1 years  Post Menopausal Bleeding: No  Hormone Replacement Therapy: No  Breast History  Last Breast Imaging Date: No  History of Abnormal Breast Imaging : No  History of Breast Biopsy: No  Pap History   Last pap date: 22  Result: Normal  History of Abnormal Pap: No  HPV Vaccine Completed: No (0/3)      Review of systems:  General/Constitutional: Chills denies. Fatigue/weakness denies. Fever denies. Night sweats denies. Hot flashes denies  Breast: [place] denies. Breast mass denies. Breast pain/tenderness denies. Nipple discharge denies. Puckering denies.  Gastrointestinal: Abdominal pain denies. Blood in stool denies. Constipation denies. Diarrhea denies. Heartburn denies. Nausea denies. Vomiting denies   Genitourinary: Incontinence denies. Blood in urine denies. Frequent urination denies. Urgency denies. Painful urination denies. Nocturia denies   Gynecologic: Irregular menses denies. Heavy bleeding denies. Painful menses denies. Vaginal discharge denies. Vaginal odor denies. Vaginal itching/Irritation denies. Vaginal lesion denies.  Pelvic pain denies. Decreased libido denies. Vulvar lesion denies. Prolapse of genital organs denies. Painful intercourse denies. Postcoital bleeding denies   Psychiatric: Mood lability denies. Depressed mood denies. Suicidal thoughts denies. Anxiety denies. Overwhelmed denies. Appetite normal. Energy level normal.     OB History    Para Term  AB Living   4 3 3 0 1 1   SAB IAB  "Ectopic Multiple Live Births   0 0 1 0 2      # 1 - Date: [birth year], Sex: Male, Weight: None, GA: None, Type: Vaginal, Spontaneous, Apgar1: None, Apgar5: None, Living: Living, Birth Comments: None    # 2 - Date: [birth year], Sex: Male, Weight: None, GA: None, Type: Vaginal, Spontaneous, Apgar1: None, Apgar5: None, Living:  Demise, Birth Comments: None    # 3 - Date: [birth year], Sex: None, Weight: None, GA: None, Type: Vaginal, Spontaneous, Apgar1: None, Apgar5: None, Living: Fetal Demise, Birth Comments: None    # 4 - Date: , Sex: None, Weight: None, GA: None, Type: Spontaneous , Apgar1: None, Apgar5: None, Living: Fetal Demise, Birth Comments: None      Past Medical History:   Diagnosis Date    Degeneration of lumbar intervertebral disc     Elevated LDL cholesterol level     Hot flash, menopausal     Impacted cerumen, bilateral     Neuropathy        Current Outpatient Medications:     busPIRone (BUSPAR) 5 MG Tab, Take 1 tablet (5 mg total) by mouth 2 (two) times daily., Disp: 60 tablet, Rfl: 11    EScitalopram oxalate (LEXAPRO) 10 MG tablet, Take 1 tablet (10 mg total) by mouth every evening., Disp: 30 tablet, Rfl: 11    gabapentin (NEURONTIN) 300 MG capsule, Take 1 capsule (300 mg total) by mouth 3 (three) times daily., Disp: 90 capsule, Rfl: 5    naproxen (NAPROSYN) 500 MG tablet, Take 1 tablet (500 mg total) by mouth 2 (two) times daily as needed (pain)., Disp: 60 tablet, Rfl: 11    tiZANidine (ZANAFLEX) 4 MG tablet, Take 1 tablet (4 mg total) by mouth every 8 (eight) hours as needed (muscle pain)., Disp: 30 tablet, Rfl: 4    cephALEXin (KEFLEX) 500 MG capsule, Take 1 capsule (500 mg total) by mouth 4 (four) times daily. for 7 days, Disp: 28 capsule, Rfl: 0      Physical Exam:  /74   Temp 96.8 °F (36 °C)   Ht 5' 6.93" (1.7 m)   Wt 57.3 kg (126 lb 5.2 oz)   BMI 19.83 kg/m²     Chaperone present.       Constitutional: General appearance: healthy, well-nourished and " well-developed  Psychiatric:  Orientation to time, place and person. Normal mood and affect and active, alert   Abdomen: Auscultation/Inspection/Palpation: No tenderness or masses. Soft, nondistended      Female Genitalia:      Vulva: no masses, atrophy or lesions      Bladder/Urethra: no urethral discharge or mass, normal meatus, bladder non-distended.      Vagina: deferred      Inguinal: 1cm inguinal abscess, small amount of purulent drainage        Assessment/Plan:  1. Inguinal abscess  Educated  Wash BID w/ soap and water  Warm soaks/compress  Fever/chills/infection precautions  Rx: Keflex 500 mg QID x7 days  Advised to call office if no improvement after 72 hours    -     cephALEXin (KEFLEX) 500 MG capsule; Take 1 capsule (500 mg total) by mouth 4 (four) times daily. for 7 days  Dispense: 28 capsule; Refill: 0         This note was transcribed by Irina Caba MA. There may be transcription errors as a result, however minimal. Effort has been made to ensure accuracy of transcription, but any obvious errors or omissions should be clarified with the author of the document.

## 2024-09-16 ENCOUNTER — OFFICE VISIT (OUTPATIENT)
Dept: FAMILY MEDICINE | Facility: CLINIC | Age: 49
End: 2024-09-16
Payer: MEDICAID

## 2024-09-16 VITALS
TEMPERATURE: 98 F | OXYGEN SATURATION: 97 % | BODY MASS INDEX: 20.06 KG/M2 | WEIGHT: 127.81 LBS | HEART RATE: 72 BPM | SYSTOLIC BLOOD PRESSURE: 112 MMHG | HEIGHT: 67 IN | DIASTOLIC BLOOD PRESSURE: 70 MMHG

## 2024-09-16 DIAGNOSIS — Z12.31 SCREENING MAMMOGRAM FOR BREAST CANCER: ICD-10-CM

## 2024-09-16 DIAGNOSIS — M54.31 SCIATIC PAIN, RIGHT: ICD-10-CM

## 2024-09-16 DIAGNOSIS — Z23 IMMUNIZATION DUE: ICD-10-CM

## 2024-09-16 DIAGNOSIS — M51.36 DEGENERATION OF INTERVERTEBRAL DISC OF LUMBAR REGION: Primary | ICD-10-CM

## 2024-09-16 DIAGNOSIS — Z12.11 COLON CANCER SCREENING: ICD-10-CM

## 2024-09-16 PROCEDURE — 90471 IMMUNIZATION ADMIN: CPT | Mod: ,,, | Performed by: NURSE PRACTITIONER

## 2024-09-16 PROCEDURE — 90656 IIV3 VACC NO PRSV 0.5 ML IM: CPT | Mod: ,,, | Performed by: NURSE PRACTITIONER

## 2024-09-16 PROCEDURE — 3044F HG A1C LEVEL LT 7.0%: CPT | Mod: CPTII,,, | Performed by: NURSE PRACTITIONER

## 2024-09-16 PROCEDURE — 3078F DIAST BP <80 MM HG: CPT | Mod: CPTII,,, | Performed by: NURSE PRACTITIONER

## 2024-09-16 PROCEDURE — 1160F RVW MEDS BY RX/DR IN RCRD: CPT | Mod: CPTII,,, | Performed by: NURSE PRACTITIONER

## 2024-09-16 PROCEDURE — 3074F SYST BP LT 130 MM HG: CPT | Mod: CPTII,,, | Performed by: NURSE PRACTITIONER

## 2024-09-16 PROCEDURE — 3008F BODY MASS INDEX DOCD: CPT | Mod: CPTII,,, | Performed by: NURSE PRACTITIONER

## 2024-09-16 PROCEDURE — 99214 OFFICE O/P EST MOD 30 MIN: CPT | Mod: 25,,, | Performed by: NURSE PRACTITIONER

## 2024-09-16 PROCEDURE — 1159F MED LIST DOCD IN RCRD: CPT | Mod: CPTII,,, | Performed by: NURSE PRACTITIONER

## 2024-09-16 RX ORDER — TIZANIDINE 4 MG/1
4 TABLET ORAL EVERY 8 HOURS PRN
Qty: 30 TABLET | Refills: 4 | Status: SHIPPED | OUTPATIENT
Start: 2024-09-16

## 2024-09-16 RX ORDER — GABAPENTIN 300 MG/1
300 CAPSULE ORAL 3 TIMES DAILY
Qty: 90 CAPSULE | Refills: 5 | Status: SHIPPED | OUTPATIENT
Start: 2024-09-16
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
## 2024-10-14 ENCOUNTER — PATIENT MESSAGE (OUTPATIENT)
Dept: FAMILY MEDICINE | Facility: CLINIC | Age: 49
End: 2024-10-14
Payer: MEDICAID

## 2024-10-29 ENCOUNTER — PATIENT MESSAGE (OUTPATIENT)
Facility: CLINIC | Age: 49
End: 2024-10-29
Payer: MEDICAID

## 2025-03-09 DIAGNOSIS — M51.369 DEGENERATION OF INTERVERTEBRAL DISC OF LUMBAR REGION: ICD-10-CM

## 2025-03-09 DIAGNOSIS — F41.9 ANXIETY: ICD-10-CM

## 2025-03-10 RX ORDER — TIZANIDINE 4 MG/1
TABLET ORAL
Qty: 30 TABLET | Refills: 0 | Status: SHIPPED | OUTPATIENT
Start: 2025-03-10

## 2025-03-10 RX ORDER — BUSPIRONE HYDROCHLORIDE 5 MG/1
TABLET ORAL
Qty: 60 TABLET | Refills: 0 | Status: SHIPPED | OUTPATIENT
Start: 2025-03-10

## 2025-03-10 RX ORDER — NAPROXEN 500 MG/1
TABLET ORAL
Qty: 60 TABLET | Refills: 0 | Status: SHIPPED | OUTPATIENT
Start: 2025-03-10

## 2025-03-10 RX ORDER — ESCITALOPRAM OXALATE 10 MG/1
TABLET ORAL
Qty: 30 TABLET | Refills: 0 | Status: SHIPPED | OUTPATIENT
Start: 2025-03-10

## 2025-03-11 PROCEDURE — 85025 COMPLETE CBC W/AUTO DIFF WBC: CPT | Performed by: NURSE PRACTITIONER

## 2025-03-11 PROCEDURE — 84443 ASSAY THYROID STIM HORMONE: CPT | Performed by: NURSE PRACTITIONER

## 2025-03-11 PROCEDURE — 83036 HEMOGLOBIN GLYCOSYLATED A1C: CPT | Performed by: NURSE PRACTITIONER

## 2025-03-11 PROCEDURE — 80061 LIPID PANEL: CPT | Performed by: NURSE PRACTITIONER

## 2025-03-11 PROCEDURE — 80053 COMPREHEN METABOLIC PANEL: CPT | Performed by: NURSE PRACTITIONER

## 2025-03-12 ENCOUNTER — RESULTS FOLLOW-UP (OUTPATIENT)
Dept: FAMILY MEDICINE | Facility: CLINIC | Age: 50
End: 2025-03-12

## 2025-03-18 ENCOUNTER — OFFICE VISIT (OUTPATIENT)
Dept: FAMILY MEDICINE | Facility: CLINIC | Age: 50
End: 2025-03-18
Payer: MEDICAID

## 2025-03-18 ENCOUNTER — RESULTS FOLLOW-UP (OUTPATIENT)
Dept: FAMILY MEDICINE | Facility: CLINIC | Age: 50
End: 2025-03-18

## 2025-03-18 VITALS
OXYGEN SATURATION: 94 % | WEIGHT: 135.81 LBS | BODY MASS INDEX: 21.31 KG/M2 | HEIGHT: 67 IN | DIASTOLIC BLOOD PRESSURE: 76 MMHG | TEMPERATURE: 98 F | SYSTOLIC BLOOD PRESSURE: 104 MMHG | HEART RATE: 76 BPM

## 2025-03-18 DIAGNOSIS — G62.9 NEUROPATHY: ICD-10-CM

## 2025-03-18 DIAGNOSIS — Z12.31 SCREENING MAMMOGRAM FOR BREAST CANCER: ICD-10-CM

## 2025-03-18 DIAGNOSIS — Z13.31 POSITIVE DEPRESSION SCREENING: ICD-10-CM

## 2025-03-18 DIAGNOSIS — Z00.01 ABNORMAL WELLNESS EXAM: Primary | ICD-10-CM

## 2025-03-18 DIAGNOSIS — F41.9 ANXIETY: ICD-10-CM

## 2025-03-18 DIAGNOSIS — F33.0 MILD EPISODE OF RECURRENT MAJOR DEPRESSIVE DISORDER: ICD-10-CM

## 2025-03-18 DIAGNOSIS — R79.89 ELEVATED TSH: ICD-10-CM

## 2025-03-18 DIAGNOSIS — Z12.11 COLON CANCER SCREENING: ICD-10-CM

## 2025-03-18 DIAGNOSIS — Z72.0 TOBACCO USER: ICD-10-CM

## 2025-03-18 DIAGNOSIS — E78.49 OTHER HYPERLIPIDEMIA: ICD-10-CM

## 2025-03-18 PROBLEM — M62.838 MUSCLE SPASM: Status: RESOLVED | Noted: 2024-03-14 | Resolved: 2025-03-18

## 2025-03-18 PROBLEM — R52 PAIN: Status: RESOLVED | Noted: 2024-03-14 | Resolved: 2025-03-18

## 2025-03-18 LAB
T4 FREE SERPL-MCNC: 1 NG/DL (ref 0.78–2.19)
TSH SERPL-ACNC: 2.51 UIU/ML (ref 0.36–3.74)

## 2025-03-18 PROCEDURE — 1160F RVW MEDS BY RX/DR IN RCRD: CPT | Mod: CPTII,,, | Performed by: NURSE PRACTITIONER

## 2025-03-18 PROCEDURE — 3008F BODY MASS INDEX DOCD: CPT | Mod: CPTII,,, | Performed by: NURSE PRACTITIONER

## 2025-03-18 PROCEDURE — 1159F MED LIST DOCD IN RCRD: CPT | Mod: CPTII,,, | Performed by: NURSE PRACTITIONER

## 2025-03-18 PROCEDURE — 3078F DIAST BP <80 MM HG: CPT | Mod: CPTII,,, | Performed by: NURSE PRACTITIONER

## 2025-03-18 PROCEDURE — 84439 ASSAY OF FREE THYROXINE: CPT | Performed by: NURSE PRACTITIONER

## 2025-03-18 PROCEDURE — 84443 ASSAY THYROID STIM HORMONE: CPT | Performed by: NURSE PRACTITIONER

## 2025-03-18 PROCEDURE — 99396 PREV VISIT EST AGE 40-64: CPT | Mod: 25,,, | Performed by: NURSE PRACTITIONER

## 2025-03-18 PROCEDURE — 3074F SYST BP LT 130 MM HG: CPT | Mod: CPTII,,, | Performed by: NURSE PRACTITIONER

## 2025-03-18 PROCEDURE — 99406 BEHAV CHNG SMOKING 3-10 MIN: CPT | Mod: ,,, | Performed by: NURSE PRACTITIONER

## 2025-03-18 PROCEDURE — 3044F HG A1C LEVEL LT 7.0%: CPT | Mod: CPTII,,, | Performed by: NURSE PRACTITIONER

## 2025-03-18 RX ORDER — IBUPROFEN 200 MG
1 TABLET ORAL DAILY
Qty: 14 PATCH | Refills: 0 | Status: SHIPPED | OUTPATIENT
Start: 2025-04-27 | End: 2025-05-11

## 2025-03-18 RX ORDER — DULOXETIN HYDROCHLORIDE 60 MG/1
60 CAPSULE, DELAYED RELEASE ORAL DAILY
Qty: 30 CAPSULE | Refills: 3 | Status: SHIPPED | OUTPATIENT
Start: 2025-03-18 | End: 2026-03-18

## 2025-03-18 RX ORDER — NICOTINE 7MG/24HR
1 PATCH, TRANSDERMAL 24 HOURS TRANSDERMAL DAILY
Qty: 14 PATCH | Refills: 0 | Status: SHIPPED | OUTPATIENT
Start: 2025-05-07 | End: 2025-05-21

## 2025-03-18 RX ORDER — BUSPIRONE HYDROCHLORIDE 5 MG/1
10 TABLET ORAL 2 TIMES DAILY
Qty: 60 TABLET | Refills: 3 | Status: SHIPPED | OUTPATIENT
Start: 2025-03-18

## 2025-03-18 RX ORDER — IBUPROFEN 200 MG
1 TABLET ORAL DAILY
Qty: 42 PATCH | Refills: 0 | Status: SHIPPED | OUTPATIENT
Start: 2025-03-18 | End: 2025-04-29

## 2025-03-18 NOTE — ASSESSMENT & PLAN NOTE
Educated patient on need to identify triggers for cigarette smoking and to find an alternative to alleviate these triggers such as walking, eating unsalted sunflower seeds, eating carrots. Advised patient to develop a plan to quit smoking whether it is to decrease by a few cigarettes every 3-5 days or quit cold turkey. Advised patient to schedule a quit date. Spent 3 minutes discussing smoking cessation with patient. Patient states understanding. Begin nicotine patches as tolerated well in the past.

## 2025-03-18 NOTE — PROGRESS NOTES
Notify patient repeat thyroid test results are normal. No further orders. Keep next appointment as scheduled.

## 2025-03-18 NOTE — ASSESSMENT & PLAN NOTE
Lipid Panel:  Lab Results   Component Value Date    CHOL 181 03/11/2025    HDL 72 (H) 03/11/2025    LDLDIRECT 93.4 03/11/2025    TRIG 37 03/11/2025    AST 30 03/11/2025    ALT 17 03/11/2025    ALKPHOS 83 03/11/2025    LABPROT 6.2 (L) 03/11/2025    ALBUMIN 3.9 03/11/2025

## 2025-03-18 NOTE — PROGRESS NOTES
Patient ID: Jazmine Donovan YOB: 1975    Chief Complaint: Annual Exam    Allergies: Patient has no known allergies.     History of Present Illness:  The patient is a 50 y.o. White female who presents to clinic for annual wellness visit.    Diet and nutrition:  Diet is high in salt, high in fat, low in fiber, high caloric intake, high carbohydrate meals, high calcium intake.    Fracture risk: No history of fracture, no recent unexplained fracture.    Physical activity:  Does not exercise on a regular basis, good physical condition.    Depression risks:  + history of depression, sometimes feel sad, empty, or tearful, no sleep disturbances, + agitation, + loss of energy, no feelings of worthlessness or guilt, no thoughts of suicide.    Orientation:  No disorientation to time, no disorientation to place.    Concentration and memory:  No decreased concentration ability, no memory lapses or loss, does not forget words.    Speech forward/motor difficulties: No speech difficulties, no difficulty expressing formulated concepts, no difficulty with fine manipulative tasks, no difficulty writing forward/copying, no slowed reaction time, does not knock things over when trying to pick them up.    Fall risk assessment:  No frequent falls while walking, no fall in the past year, no dizziness forward/vertigo, no fear of falling.  Hearing:  No loss of hearing, does not wear hearing aids.    Vision:  No vision problems, does wear glasses.    Activity of daily living: Able to bathe with limited or no assistance, able to control urination and bowels, able to dress with limited or no assistance, able to feed self with limited or no assistance, able to get out of chair or bed with limited or no assistance, able to [place] with limited or no assistance, able to toilet with limited are no assistance.    Activities of daily living:  Able to do housework with limited or no assistance, able to grocery shop with limited or no  assistance, able to manage medications with limited or no assistance, able to manage money with limited or no assistance, able to prepare meals with limited or no assistance, able to use the phone with limited or no assistance.    Screenings: due for vaccinations, due for breast cancer screening, not due for cervical cancer screening, due for colorectal cancer screening.    Depression Patient Health Questionnaire (PHQ-2)  Over the last two weeks how often have you been bothered by little interest or pleasure in doing things: Several days  Over the last two weeks how often have you been bothered by feeling down, depressed or hopeless: Nearly every day  PHQ-2 Total Score: 4  Depression Patient Health Questionnaire (PHQ-9)  Over the last two weeks how often have you been bothered by little interest or pleasure in doing things: Several days  Over the last two weeks how often have you been bothered by feeling down, depressed or hopeless: Nearly every day  Over the last two weeks how often have you been bothered by trouble falling or staying asleep, or sleeping too much: More than half the days  Over the last two weeks how often have you been bothered by feeling tired or having little energy: Not at all  Over the last two weeks how often have you been bothered by a poor appetite or overeating: Not at all  Over the last two weeks how often have you been bothered by feeling bad about yourself - or that you are a failure or have let yourself or your family down: Several days  Over the last two weeks how often have you been bothered by trouble concentrating on things, such as reading the newspaper or watching television: More than half the days  Over the last two weeks how often have you been bothered by moving or speaking so slowly that other people could have noticed. Or the opposite - being so fidgety or restless that you have been moving around a lot more than usual.: Not at all  Over the last two weeks how often have you  been bothered by thoughts that you would be better off dead, or of hurting yourself: Not at all  If you checked off any problems, how difficult have these problems made it for you to do your work, take care of things at home or get along with other people?: Somewhat difficult  PHQ-9 Score: 9  PHQ-9 Interpretation: Mild    Generalized Anxiety Disorder 7-item (KUMAR-7) Scale. HOW OFTEN DURING THE PAST 2 WEEKS HAVE YOU FELT BOTHERED BY:  1. Feeling nervous, anxious, or on edge?: Not at all  2. Not being able to stop or control worrying?: More than half the days  3. Worrying too much about different things?: Several days  4. Trouble relaxing?: Not at all  5. Being so restless that it is hard to sit still?: Not at all  6. Becoming easily annoyed or irritable?: Several days  7. Feeling afraid as if something awful might happen?: Not at all  8. If you checked off any problems, how difficult have these problems made it for you to do your work, take care of things at home, or get along with other people?: Not difficult at all  KUMAR-7 Score: 4  Number answered (out of first 7): 7  Interpretation: Normal         Past Medical History:  has a past medical history of Degeneration of lumbar intervertebral disc, Elevated LDL cholesterol level, Hot flash, menopausal, Impacted cerumen, bilateral, and Neuropathy.    Surgical History:  has a past surgical history that includes Tubal ligation (2001); Dilation and curettage of uterus (2001); hysterectomy, vaginal, laparoscopy-assisted, with salpingo-oophorectoy (11/17/2011); and Surgical procedure for stress incontinence using tension free vaginal tape (11/17/2011).    Family History: family history includes Coronary artery disease in her father; Diabetes type II in her father; Hypertension in her father.    Social History:  reports that she has been smoking cigarettes and vaping with nicotine. She started smoking about 32 years ago. She has a 16.1 pack-year smoking history. She has never  used smokeless tobacco. She reports that she does not currently use alcohol. She reports that she does not use drugs.    Care Team: Patient Care Team:  Nino Guthrie APRN as PCP - General (Family Medicine)  Abril Sawant MD as Consulting Physician (Obstetrics and Gynecology)     Current Medications:  Current Outpatient Medications   Medication Instructions    busPIRone (BUSPAR) 10 mg, Oral, 2 times daily    DULoxetine (CYMBALTA) 60 mg, Oral, Daily    gabapentin (NEURONTIN) 300 mg, Oral, 3 times daily    naproxen (NAPROSYN) 500 MG tablet TAKE 1 TABLET TWICE DAILY WITH FOOD AS NEEDED FOR PAIN    [START ON 4/27/2025] nicotine (NICODERM CQ) 14 mg/24 hr 1 patch, Transdermal, Daily    nicotine (NICODERM CQ) 21 mg/24 hr 1 patch, Transdermal, Daily    [START ON 5/7/2025] nicotine (NICODERM CQ) 7 mg/24 hr 1 patch, Transdermal, Daily    tiZANidine (ZANAFLEX) 4 MG tablet TAKE 1 TABLET EVERY 8 HOURS AS NEEDED FOR MUSCLE SPASMS --MAY CAUSE DROWSINESS--       Review of Systems   Constitutional:  Negative for appetite change, fatigue, fever and unexpected weight change.   HENT:  Negative for nasal congestion, ear pain, facial swelling, hearing loss, mouth sores, nosebleeds, sore throat and trouble swallowing.    Eyes:  Negative for pain, discharge, redness and visual disturbance.   Respiratory:  Negative for cough, chest tightness and shortness of breath.    Cardiovascular:  Negative for chest pain, palpitations and leg swelling.   Gastrointestinal:  Negative for abdominal pain, blood in stool, constipation, diarrhea and nausea.   Endocrine: Negative for cold intolerance, heat intolerance, polydipsia, polyphagia and polyuria.   Genitourinary:  Negative for difficulty urinating, dysuria, frequency and hematuria.   Musculoskeletal:  Positive for arthralgias and back pain. Negative for joint swelling and joint deformity.   Integumentary:  Negative for color change, rash and mole/lesion.   Neurological:  Negative for dizziness,  "weakness, headaches and memory loss.   Hematological:  Negative for adenopathy. Does not bruise/bleed easily.   Psychiatric/Behavioral:  Negative for confusion, sleep disturbance and suicidal ideas.         Visit Vitals  /76 (BP Location: Left arm, Patient Position: Sitting)   Pulse 76   Temp 97.7 °F (36.5 °C) (Temporal)   Ht 5' 6.93" (1.7 m)   Wt 61.6 kg (135 lb 12.8 oz)   SpO2 (!) 94%   BMI 21.31 kg/m²       Physical Exam  Vitals reviewed.   Constitutional:       General: She is not in acute distress.     Appearance: Normal appearance.   HENT:      Head: Normocephalic and atraumatic.      Right Ear: Tympanic membrane, ear canal and external ear normal.      Left Ear: Tympanic membrane, ear canal and external ear normal.      Nose: Nose normal. No congestion.      Mouth/Throat:      Mouth: Mucous membranes are moist.      Pharynx: Oropharynx is clear. No oropharyngeal exudate or posterior oropharyngeal erythema.   Eyes:      Conjunctiva/sclera: Conjunctivae normal.   Cardiovascular:      Rate and Rhythm: Normal rate and regular rhythm.      Pulses: Normal pulses.      Heart sounds: No murmur heard.  Pulmonary:      Effort: Pulmonary effort is normal.      Breath sounds: Normal breath sounds.   Abdominal:      General: Bowel sounds are normal.      Palpations: Abdomen is soft.      Tenderness: There is no abdominal tenderness.   Musculoskeletal:         General: No swelling or deformity.      Right lower leg: No edema.      Left lower leg: No edema.   Lymphadenopathy:      Cervical: No cervical adenopathy.   Skin:     General: Skin is warm and dry.      Coloration: Skin is not jaundiced.      Findings: No rash.   Neurological:      Mental Status: She is alert and oriented to person, place, and time.      Cranial Nerves: No cranial nerve deficit.   Psychiatric:         Mood and Affect: Mood normal.         Behavior: Behavior normal.          Labs Reviewed:  Chemistry:  Lab Results   Component Value Date    NA " 138 03/11/2025    K 5.1 03/11/2025    BUN 13 03/11/2025    CREATININE 0.81 03/11/2025    EGFRNORACEVR 89 03/11/2025    GLUCOSE 82 03/11/2025    CALCIUM 9.4 03/11/2025    ALKPHOS 83 03/11/2025    LABPROT 6.2 (L) 03/11/2025    ALBUMIN 3.9 03/11/2025    AST 30 03/11/2025    ALT 17 03/11/2025    TSH 4.350 (H) 03/11/2025        Lab Results   Component Value Date    HGBA1C 5.3 03/11/2025        Hematology:  Lab Results   Component Value Date    WBC 5.43 03/11/2025    RBC 4.97 03/11/2025    HGB 15.1 03/11/2025    HCT 44.8 03/11/2025    MCV 90.1 03/11/2025    MCH 30.4 03/11/2025    MCHC 33.7 03/11/2025    RDW 12.7 03/11/2025     03/11/2025    MPV 12.8 (H) 03/11/2025       Lipid Panel:  Lab Results   Component Value Date    CHOL 181 03/11/2025    HDL 72 (H) 03/11/2025    LDLDIRECT 93.4 03/11/2025    TRIG 37 03/11/2025         Assessment & Plan:  1. Abnormal wellness exam  Overview:  Cervical Cancer Screening- hysterectomy  Breast Cancer Screening- mammogram ordered  Osteoporosis Screening- not due  Colon Cancer Screening -  cologard ordered  Eye Exam- Recommend annually. Refer  Dental Exam- Recommend biannually.      Orders:  -     CBC Auto Differential; Future; Expected date: 03/18/2026  -     Comprehensive Metabolic Panel; Future; Expected date: 03/18/2026  -     Lipid Panel; Future; Expected date: 03/18/2026  -     TSH; Future; Expected date: 03/18/2026  -     Hemoglobin A1C; Future; Expected date: 03/18/2026    2. Anxiety  Overview:  Increase buspar to 10mg     Orders:  -     busPIRone (BUSPAR) 5 MG Tab; Take 2 tablets (10 mg total) by mouth 2 (two) times daily.  Dispense: 60 tablet; Refill: 3    3. Mild episode of recurrent major depressive disorder  Overview:  change lexapro to cymbalta for depression and chronic pain    Assessment & Plan:  The risks and benefits of medications were discussed with the patient. All questions answered.       Orders:  -     DULoxetine (CYMBALTA) 60 MG capsule; Take 1 capsule (60 mg  total) by mouth once daily.  Dispense: 30 capsule; Refill: 3    4. Tobacco user  Assessment & Plan:  Educated patient on need to identify triggers for cigarette smoking and to find an alternative to alleviate these triggers such as walking, eating unsalted sunflower seeds, eating carrots. Advised patient to develop a plan to quit smoking whether it is to decrease by a few cigarettes every 3-5 days or quit cold turkey. Advised patient to schedule a quit date. Spent 3 minutes discussing smoking cessation with patient. Patient states understanding. Begin nicotine patches as tolerated well in the past.       Orders:  -     nicotine (NICODERM CQ) 7 mg/24 hr; Place 1 patch onto the skin once daily. for 14 days  Dispense: 14 patch; Refill: 0  -     nicotine (NICODERM CQ) 14 mg/24 hr; Place 1 patch onto the skin once daily. for 14 days  Dispense: 14 patch; Refill: 0  -     nicotine (NICODERM CQ) 21 mg/24 hr; Place 1 patch onto the skin once daily.  Dispense: 42 patch; Refill: 0    5. Colon cancer screening  -     Cologuard Screening (Multitarget Stool DNA); Future; Expected date: 03/18/2025    6. Other hyperlipidemia  Overview:  Improved with lifestyle modifications    Assessment & Plan:  Lipid Panel:  Lab Results   Component Value Date    CHOL 181 03/11/2025    HDL 72 (H) 03/11/2025    LDLDIRECT 93.4 03/11/2025    TRIG 37 03/11/2025    AST 30 03/11/2025    ALT 17 03/11/2025    ALKPHOS 83 03/11/2025    LABPROT 6.2 (L) 03/11/2025    ALBUMIN 3.9 03/11/2025          7. Screening mammogram for breast cancer  -     Mammo Digital Screening Bilat w/ Stan (XPD); Future; Expected date: 03/18/2025    8. Positive depression screening  Comments:  I have reviewed the positive depression score which warrants active treatment with psychotherapy and/or medications.    9. Elevated TSH  Comments:  will notify of repeat lab results when available  Orders:  -     TSH; Future; Expected date: 03/18/2025  -     T4, Free; Future; Expected date:  03/18/2025    10. Neuropathy  Overview:  Takes gabapentin 300mg tid, begin cymbalta           Vaccinations:  Immunization History   Administered Date(s) Administered    Influenza - Quadrivalent - PF *Preferred* (6 months and older) 12/01/2021, 03/14/2024    Influenza - Trivalent - Fluarix, Flulaval, Fluzone, Afluria - PF 09/16/2024    Td (ADULT) 02/06/2007, 06/17/2011       No future appointments.    Follow up for 1), 6 wk f/u, Anxiety/Depression , 2), 1yr, Wellness, Fasting labs prior. Call sooner if needed.    ANDRÉS BASURTO        Lab Frequency Next Occurrence   Ambulatory referral/consult to Optometry Once 03/21/2024   Ambulatory referral/consult to Physical/Occupational Therapy Once 09/23/2024   Mammo Digital Screening Bilat w/ Stan (XPD) Once 03/18/2025   TSH Once 03/18/2025   T4, Free Once 03/18/2025   CBC Auto Differential Once 03/18/2026   Comprehensive Metabolic Panel Once 03/18/2026   Lipid Panel Once 03/18/2026   TSH Once 03/18/2026   Hemoglobin A1C Once 03/18/2026           I have used clinical judgement based on duration and functional status to consider definite necessity for treatment.

## 2025-04-06 DIAGNOSIS — F41.9 ANXIETY: ICD-10-CM

## 2025-04-06 DIAGNOSIS — M51.369 DEGENERATION OF INTERVERTEBRAL DISC OF LUMBAR REGION: ICD-10-CM

## 2025-04-07 RX ORDER — GABAPENTIN 300 MG/1
CAPSULE ORAL
Qty: 90 CAPSULE | Refills: 0 | Status: SHIPPED | OUTPATIENT
Start: 2025-04-07

## 2025-04-07 RX ORDER — ESCITALOPRAM OXALATE 10 MG/1
TABLET ORAL
Qty: 30 TABLET | Refills: 0 | Status: SHIPPED | OUTPATIENT
Start: 2025-04-07

## 2025-04-07 RX ORDER — NAPROXEN 500 MG/1
TABLET ORAL
Qty: 60 TABLET | Refills: 0 | Status: SHIPPED | OUTPATIENT
Start: 2025-04-07

## 2025-04-07 RX ORDER — TIZANIDINE 4 MG/1
TABLET ORAL
Qty: 30 TABLET | Refills: 0 | Status: SHIPPED | OUTPATIENT
Start: 2025-04-07

## 2025-05-01 ENCOUNTER — OFFICE VISIT (OUTPATIENT)
Dept: FAMILY MEDICINE | Facility: CLINIC | Age: 50
End: 2025-05-01
Payer: MEDICAID

## 2025-05-01 VITALS
DIASTOLIC BLOOD PRESSURE: 80 MMHG | OXYGEN SATURATION: 98 % | BODY MASS INDEX: 21.19 KG/M2 | WEIGHT: 135 LBS | HEART RATE: 62 BPM | TEMPERATURE: 97 F | HEIGHT: 67 IN | SYSTOLIC BLOOD PRESSURE: 110 MMHG

## 2025-05-01 DIAGNOSIS — F33.0 MILD EPISODE OF RECURRENT MAJOR DEPRESSIVE DISORDER: ICD-10-CM

## 2025-05-01 DIAGNOSIS — Z72.0 TOBACCO USER: ICD-10-CM

## 2025-05-01 DIAGNOSIS — R06.09 DYSPNEA ON EXERTION: ICD-10-CM

## 2025-05-01 DIAGNOSIS — F41.9 ANXIETY: Primary | ICD-10-CM

## 2025-05-01 RX ORDER — BUPROPION HYDROCHLORIDE 150 MG/1
150 TABLET, EXTENDED RELEASE ORAL 2 TIMES DAILY
Qty: 60 TABLET | Refills: 2 | Status: SHIPPED | OUTPATIENT
Start: 2025-05-01 | End: 2026-05-01

## 2025-05-01 RX ORDER — DULOXETIN HYDROCHLORIDE 60 MG/1
60 CAPSULE, DELAYED RELEASE ORAL DAILY
Qty: 90 CAPSULE | Refills: 1 | Status: SHIPPED | OUTPATIENT
Start: 2025-05-01 | End: 2026-05-01

## 2025-05-01 RX ORDER — BUSPIRONE HYDROCHLORIDE 10 MG/1
10 TABLET ORAL 2 TIMES DAILY
Qty: 60 TABLET | Refills: 5 | Status: SHIPPED | OUTPATIENT
Start: 2025-05-01

## 2025-05-01 NOTE — PROGRESS NOTES
Patient ID: Jazmine Donovan  : 1975    Chief Complaint: Anxiety and Depression    Allergies: Patient has no known allergies.     Subjective :  The patient is a 50 y.o. White female who presents to clinic for follow up on Anxiety and Depression   HPI   History of Present Illness    HPI:  Patient reports that her current medications, including Cymbalta (recently changed from Lexapro) and Buspar, are working well. 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 She expresses concern about throat cancer due to her father's history of the disease and her siblings' recent kidney cancer diagnoses. She also mentions a family history of COPD. She denies any sinus problems. Patient admits that she was resistant to cancer screenings in the past but is ready to move forward with screenings now.     MEDICATIONS:  Patient is on Cymbalta. She is also taking Buspar 10 mg, 1 tablet daily, which was changed from 5 mg tablets, 2 tablets daily. Her medication regimen was adjusted from Lexapro to Cymbalta.    FAMILY HISTORY:  Family history is significant for father with throat cancer and COPD. Her sister has a history of cancer in the back of her neck and renal cancer. Patient's brother also has a history of cancer in the back of his neck.    TEST RESULTS:  In March, the patient underwent several labs. Her blood count, sodium, potassium, and electrolytes all looked good. The cholesterol levels were also good, with HDL (healthy cholesterol) noted as high, which was considered positive. Her thyroid function was normal upon repeated testing. The A1C test results were also good.    ALLERGIES:  Patient reports experiencing itching when nicotine patches are removed.    SOCIAL HISTORY:  Smoking: Started at age 17-18, smoked half 1 PPD from . Increased to 1 pack a day about 30 years ago. Currently smokes 1 pack a  "day.      ROS:  ROS as indicated in HPI.           Social History:  reports that she has been smoking cigarettes and vaping with nicotine. She started smoking about 32 years ago. She has a 30.8 pack-year smoking history. She has never used smokeless tobacco. She reports that she does not currently use alcohol. She reports that she does not use drugs.    Past Medical History:  has a past medical history of Degeneration of lumbar intervertebral disc, Elevated LDL cholesterol level, Hot flash, menopausal, Impacted cerumen, bilateral, and Neuropathy.    Care Team: Patient Care Team:  Nino Guthrie APRN as PCP - General (Family Medicine)  Abril Sawant MD as Consulting Physician (Obstetrics and Gynecology)     Current Medications:  Current Outpatient Medications   Medication Instructions    buPROPion (WELLBUTRIN SR) 150 mg, Oral, 2 times daily, For smoking cessation    busPIRone (BUSPAR) 10 mg, Oral, 2 times daily    DULoxetine (CYMBALTA) 60 mg, Oral, Daily    gabapentin (NEURONTIN) 300 MG capsule TAKE 1 CAPSULE 3 TIMES DAILY    naproxen (NAPROSYN) 500 MG tablet TAKE 1 TABLET TWICE DAILY WITH FOOD AS NEEDED FOR PAIN    tiZANidine (ZANAFLEX) 4 MG tablet TAKE 1 TABLET EVERY 8 HOURS AS NEEDED FOR MUSCLE SPASMS --MAY CAUSE DROWSINESS--         Visit Vitals  /80 (BP Location: Right arm)   Pulse 62   Temp 96.8 °F (36 °C) (Temporal)   Ht 5' 6.93" (1.7 m)   Wt 61.2 kg (135 lb)   SpO2 98%   BMI 21.19 kg/m²       Physical Exam  Vitals reviewed.   Constitutional:       General: She is not in acute distress.     Appearance: Normal appearance.   HENT:      Head: Normocephalic and atraumatic.      Nose: Nose normal. No congestion.      Mouth/Throat:      Mouth: Mucous membranes are moist.      Pharynx: Oropharynx is clear.   Eyes:      Conjunctiva/sclera: Conjunctivae normal.   Cardiovascular:      Rate and Rhythm: Normal rate and regular rhythm.      Pulses: Normal pulses.      Heart sounds: No murmur heard.  Pulmonary:      " Effort: Pulmonary effort is normal.      Breath sounds: Normal breath sounds.   Lymphadenopathy:      Cervical: No cervical adenopathy.   Skin:     General: Skin is warm and dry.      Coloration: Skin is not jaundiced.      Findings: No rash.   Neurological:      Mental Status: She is alert and oriented to person, place, and time.      Cranial Nerves: No cranial nerve deficit.   Psychiatric:         Mood and Affect: Mood normal.         Behavior: Behavior normal.          Labs Reviewed:  Chemistry:  Lab Results   Component Value Date     03/11/2025    K 5.1 03/11/2025    BUN 13 03/11/2025    CREATININE 0.81 03/11/2025    EGFRNORACEVR 89 03/11/2025    CALCIUM 9.4 03/11/2025    ALKPHOS 83 03/11/2025    ALBUMIN 3.9 03/11/2025    AST 30 03/11/2025    ALT 17 03/11/2025    TSH 2.510 03/18/2025    INTVWN2DFVS 1.00 03/18/2025        Lab Results   Component Value Date    HGBA1C 5.3 03/11/2025        Hematology:  Lab Results   Component Value Date    WBC 5.43 03/11/2025    RBC 4.97 03/11/2025    HGB 15.1 03/11/2025    HCT 44.8 03/11/2025    MCV 90.1 03/11/2025    MCH 30.4 03/11/2025    MCHC 33.7 03/11/2025    RDW 12.7 03/11/2025     03/11/2025    MPV 12.8 (H) 03/11/2025       Lipid Panel:  Lab Results   Component Value Date    CHOL 181 03/11/2025    HDL 72 (H) 03/11/2025    LDLDIRECT 93.4 03/11/2025    TRIG 37 03/11/2025        Diagnosis:  1. Anxiety  Overview:  continue buspar 10mg BID    Orders:  -     busPIRone (BUSPAR) 10 MG tablet; Take 1 tablet (10 mg total) by mouth 2 (two) times daily.  Dispense: 60 tablet; Refill: 5    2. Mild episode of recurrent major depressive disorder  Overview:  changed lexapro to cymbalta for depression and chronic pain, mood improved    Orders:  -     DULoxetine (CYMBALTA) 60 MG capsule; Take 1 capsule (60 mg total) by mouth once daily.  Dispense: 90 capsule; Refill: 1    3. Tobacco user  Overview:  Nicotine patches caused local reaction, begin wellbutrin    Assessment &  Plan:  Educated patient on need to identify triggers for cigarette smoking and to find an alternative to alleviate these triggers such as walking, eating unsalted sunflower seeds, eating carrots. Advised patient to develop a plan to quit smoking whether it is to decrease by a few cigarettes every 3-5 days or quit cold turkey. Advised patient to schedule a quit date. Spent 3 minutes discussing smoking cessation with patient. Patient states understanding.       Orders:  -     CT Chest Lung Screening Low Dose; Future; Expected date: 05/01/2025  -     buPROPion (WELLBUTRIN SR) 150 MG TBSR 12 hr tablet; Take 1 tablet (150 mg total) by mouth 2 (two) times daily. For smoking cessation  Dispense: 60 tablet; Refill: 2    4. Dyspnea on exertion  Overview:  Obtain PFT    Orders:  -     Complete PFT with bronchodilator; Future; Expected date: 05/08/2025         Assessment & Plan    F41.9 Anxiety  F33.0 Mild episode of recurrent major depressive disorder  Z72.0 Tobacco user  R06.09 Dyspnea on exertion    IMPRESSION:  - Assessed current medication efficacy and made adjustments as needed.    F41.9 ANXIETY:  - Increased Buspar from 5 mg to 10 mg tablets, instructions changed to 1 tablet daily for anxiety/mood management.    F33.0 MILD EPISODE OF RECURRENT MAJOR DEPRESSIVE DISORDER:  - Continued Cymbalta for mood management.    Z72.0 TOBACCO USER:  - Patient to set a quit date for smoking cessation, identify triggers, and develop alternative coping strategies.  - Recommend gradually reducing cigarette consumption by 2 cigarettes per day each week.  - Initiated smoking cessation plan with nicotine patches and Wellbutrin (instructions to take daily for 3 days, then increase to twice daily).         Future Appointments   Date Time Provider Department Center   8/1/2025 10:30 AM Nino Guthrie APRN [facility]   3/11/2026  8:20 AM LAB, [facility] LABORATORY DRAW STATION [facility] JONEL Rodríguez [facility]   3/23/2026  8:30 AM Nino Guthrie APRN  FRANCIS Rod[facility]       Follow up for 3mo, Anxiety/Depression. Call sooner if needed.    ANDRÉS BASURTO    Lab Frequency Next Occurrence   Ambulatory referral/consult to Physical/Occupational Therapy Once 09/23/2024   Mammo Digital Screening Bilat w/ Stan (XPD) Once 03/18/2025   CBC Auto Differential Once 03/18/2026   Comprehensive Metabolic Panel Once 03/18/2026   Lipid Panel Once 03/18/2026   TSH Once 03/18/2026   Hemoglobin A1C Once 03/18/2026            This note was generated with the assistance of ambient listening technology. Verbal consent was obtained by the patient and accompanying visitor(s) for the recording of patient appointment to facilitate this note. I attest to having reviewed and edited the generated note for accuracy, though some syntax or spelling errors may persist. Please contact the author of this note for any clarification.

## 2025-05-13 ENCOUNTER — PATIENT MESSAGE (OUTPATIENT)
Dept: FAMILY MEDICINE | Facility: CLINIC | Age: 50
End: 2025-05-13
Payer: MEDICAID

## 2025-05-20 ENCOUNTER — TELEPHONE (OUTPATIENT)
Dept: FAMILY MEDICINE | Facility: CLINIC | Age: 50
End: 2025-05-20
Payer: MEDICAID

## 2025-05-20 DIAGNOSIS — M51.369 DEGENERATION OF INTERVERTEBRAL DISC OF LUMBAR REGION: ICD-10-CM

## 2025-05-20 RX ORDER — NAPROXEN 500 MG/1
500 TABLET ORAL 2 TIMES DAILY PRN
Qty: 60 TABLET | Refills: 0 | Status: SHIPPED | OUTPATIENT
Start: 2025-05-20

## 2025-05-30 ENCOUNTER — TELEPHONE (OUTPATIENT)
Dept: FAMILY MEDICINE | Facility: CLINIC | Age: 50
End: 2025-05-30
Payer: MEDICAID

## 2025-05-30 DIAGNOSIS — M51.369 DEGENERATION OF INTERVERTEBRAL DISC OF LUMBAR REGION: ICD-10-CM

## 2025-05-30 RX ORDER — TIZANIDINE 4 MG/1
4 TABLET ORAL EVERY 8 HOURS
Qty: 30 TABLET | Refills: 0 | Status: SHIPPED | OUTPATIENT
Start: 2025-05-30

## 2025-06-10 ENCOUNTER — PATIENT MESSAGE (OUTPATIENT)
Dept: FAMILY MEDICINE | Facility: CLINIC | Age: 50
End: 2025-06-10
Payer: MEDICAID

## 2025-06-10 ENCOUNTER — TELEPHONE (OUTPATIENT)
Dept: FAMILY MEDICINE | Facility: CLINIC | Age: 50
End: 2025-06-10
Payer: MEDICAID

## 2025-06-10 DIAGNOSIS — M51.369 DEGENERATION OF INTERVERTEBRAL DISC OF LUMBAR REGION: ICD-10-CM

## 2025-06-10 RX ORDER — GABAPENTIN 300 MG/1
CAPSULE ORAL
Qty: 90 CAPSULE | Refills: 0 | Status: SHIPPED | OUTPATIENT
Start: 2025-06-10

## 2025-06-17 ENCOUNTER — TELEPHONE (OUTPATIENT)
Dept: FAMILY MEDICINE | Facility: CLINIC | Age: 50
End: 2025-06-17
Payer: MEDICAID

## 2025-06-17 ENCOUNTER — PATIENT MESSAGE (OUTPATIENT)
Dept: FAMILY MEDICINE | Facility: CLINIC | Age: 50
End: 2025-06-17
Payer: MEDICAID

## 2025-06-17 DIAGNOSIS — M51.369 DEGENERATION OF INTERVERTEBRAL DISC OF LUMBAR REGION: ICD-10-CM

## 2025-06-17 RX ORDER — NAPROXEN 500 MG/1
500 TABLET ORAL 2 TIMES DAILY PRN
Qty: 60 TABLET | Refills: 0 | Status: SHIPPED | OUTPATIENT
Start: 2025-06-17

## 2025-06-27 DIAGNOSIS — M51.369 DEGENERATION OF INTERVERTEBRAL DISC OF LUMBAR REGION: ICD-10-CM

## 2025-06-27 RX ORDER — TIZANIDINE 4 MG/1
TABLET ORAL
Qty: 30 TABLET | Refills: 0 | Status: SHIPPED | OUTPATIENT
Start: 2025-06-27

## 2025-07-17 DIAGNOSIS — M51.369 DEGENERATION OF INTERVERTEBRAL DISC OF LUMBAR REGION: ICD-10-CM

## 2025-07-17 RX ORDER — NAPROXEN 500 MG/1
TABLET ORAL
Qty: 60 TABLET | Refills: 0 | Status: SHIPPED | OUTPATIENT
Start: 2025-07-17

## 2025-07-21 ENCOUNTER — TELEPHONE (OUTPATIENT)
Dept: FAMILY MEDICINE | Facility: CLINIC | Age: 50
End: 2025-07-21
Payer: MEDICAID

## 2025-07-21 DIAGNOSIS — M51.369 DEGENERATION OF INTERVERTEBRAL DISC OF LUMBAR REGION: ICD-10-CM

## 2025-07-21 RX ORDER — TIZANIDINE 4 MG/1
4 TABLET ORAL EVERY 8 HOURS
Qty: 30 TABLET | Refills: 3 | Status: SHIPPED | OUTPATIENT
Start: 2025-07-21

## 2025-07-21 NOTE — TELEPHONE ENCOUNTER
Called patient to let her know that she does not have tramadol on her med list and a script for naproxen was on 7/17/25 to [facility]

## 2025-07-28 NOTE — PROGRESS NOTES
Chief Complaint:  Well Woman     History of Present Illness:  Jazmine Donovan is a 50 y.o. year old  s/p tubal presents for her well woman exam s/p TV BSO 11 with Dr. Goins secondary to uterine prolapse.  . No vaginal bleeding. Patient was previously on Estrogen Injections but pharmacy no longer makes. Shortly after stopping injections, hot flashes, night sweats started. Patient would like to discuss options.     Cancer-related family history is negative for Breast cancer, Ovarian cancer, Uterine cancer, and Cervical cancer.        Gyn History:  Menstrual History  Cycle: No  Menarche Age: 1 years  No Cycle Reason: (!) Surgical  Surgical Reason: hysterectomy    Menopause  Menopause Age: 1 years  Post Menopausal Bleeding: No  Hormone Replacement Therapy: No    Pap History  Last pap date: 22  Result: Normal  History of Abnormal Pap: No  HPV Vaccine Completed: No    [place]  Sexually Active: No  STI History: No  Contraception: N/a    Breast History  Last Breast Imaging Date: Yes (patient states years ago, benign)        Review of Systems:  General/Constitutional: Chills denies. Fatigue/weakness denies. Fever denies. Night sweats denies. Hot flashes admits    Respiratory: Cough denies. Hemoptysis denies. SOB denies. Sputum production denies. Wheezing denies .   Cardiovascular: Chest pain denies. Dizziness denies. Palpitations denies. Swelling in hands/feet denies.                Breast: Dimpling denies. Breast mass denies. Breast pain/tenderness denies. Nipple discharge denies. Puckering denies.  Gastrointestinal: Abdominal pain denies. Blood in stool denies. Constipation denies. Diarrhea denies. Heartburn denies. Nausea denies. Vomiting denies    Genitourinary: Incontinence denies. Blood in urine denies. Frequent urination denies. Painful urination denies. Urinary urgency denies. Nocturia denies    Gynecologic: Irregular menses denies. Heavy bleeding denies. Painful menses denies. Vaginal  discharge denies. Vaginal odor denies. Vaginal itching denies. Vaginal lesion denies. Pelvic pain denies. Decreased libido denies. Vulvar lesion denies. Prolapse of genital organs denies. Painful intercourse denies. Postcoital bleeding denies    Psychiatric: Depression denies. Anxiety denies.     OB History    Para Term  AB Living   4 3 3 0 1 1   SAB IAB Ectopic Multiple Live Births   0 0 1 0 2      # 1 - Date: [birth year], Sex: Male, Weight: None, GA: None, Type: Vaginal, Spontaneous, Apgar1: None, Apgar5: None, Living: Living, Birth Comments: None    # 2 - Date: 97, Sex: Male, Weight: None, GA: None, Type: Vaginal, Spontaneous, Apgar1: None, Apgar5: None, Living:  Demise, Birth Comments: None    # 3 - Date: 00, Sex: None, Weight: None, GA: None, Type: Vaginal, Spontaneous, Apgar1: None, Apgar5: None, Living: Fetal Demise, Birth Comments: None    # 4 - Date: , Sex: None, Weight: None, GA: None, Type: Spontaneous , Apgar1: None, Apgar5: None, Living: Fetal Demise, Birth Comments: None      Past Medical History:   Diagnosis Date    Degeneration of lumbar intervertebral disc     Elevated LDL cholesterol level     Hot flash, menopausal     Impacted cerumen, bilateral     Neuropathy      Current Medications[1]    Review of patient's allergies indicates:  No Known Allergies    Past Surgical History:   Procedure Laterality Date    DILATION AND CURETTAGE OF UTERUS      HYSTERECTOMY, VAGINAL, LAPAROSCOPY-ASSISTED, WITH SALPINGO-OOPHORECTOY  2011    Dr. Goins for uterine prolapse    SURGICAL PROCEDURE FOR STRESS INCONTINENCE USING TENSION FREE VAGINAL TAPE  2011    TUBAL LIGATION       Family History   Problem Relation Name Age of Onset    Coronary artery disease Father      Diabetes type II Father      Hypertension Father      Breast cancer Neg Hx      Colon cancer Neg Hx      Ovarian cancer Neg Hx      Uterine cancer Neg Hx      Cervical cancer Neg Hx    "    Social History[2]    Physical Exam:  /70 (BP Location: Right arm, Patient Position: Sitting)   Ht 5' 6" (1.676 m)   Wt 57.2 kg (126 lb)   BMI 20.34 kg/m²     Chaperone: present.       General appearance: healthy, well-nourished and well-developed     Psychiatric: Orientation to time, place and person. Normal mood and affect and active, alert     Skin: Appearance: no rashes or lesions.     Neck:   Neck: supple, FROM, trachea midline. and no masses   Thyroid: no enlargement or nodules and non-tender.       Cardiovascular:   Auscultation: RRR and no murmur.   Peripheral Vascular: no varicosities, LLE edema, RLE edema, calf tenderness, and palpable cord and pedal pulses intact.     Lungs:   Respiratory effort: no intercostal retractions or accessory muscle usage.   Auscultation: no wheezing, rales/crackles, or rhonchi and clear to auscultation.     Breast:   Inspection/Palpation: no tenderness, discrete/distinct masses, skin changes, or abnormal secretions. Nipple appearance normal.     Abdomen:   Auscultation/Inspection/Palpation: no hepatomegaly, splenomegaly, masses, tenderness or CVA tenderness and soft, non-distended bowel sounds preset.    Hernia: no palpable hernias.     Female Genitalia:    Vulva: no masses, tenderness or lesions    Bladder/Urethra: no urethral discharge or mass, normal meatus, bladder non-distended.    Vagina: no tenderness, erythema, cystocele, rectocele, abnormal vaginal discharge or vesicle(s) or ulcers    Cuff intact, no masses, NT   Adnexa/Parametria: no parametrial tenderness or mass, no adnexal tenderness or ovarian mass.     Lymph Nodes:   Palpation: non tender submandibular nodes, axillary nodes, or inguinal nodes.     Rectal Exam:   Rectum: normal perianal skin.       Assessment/Plan:  1. Well woman exam  No pap, s/p hyst, no h/o abnl pap   Recommend BSE monthly  Discussed recommendations of annual screening after age of 40 with mammogram  Explained that screening is not " 100% reliable. Advised patient if she notices any changes to her breast including a lump, mass, dimpling, discharge, rash, or tenderness she should contact us immediately.     Healthy diet, exercise   MMG  Multivitamin   Seat belt   Sunscreen use   Safe sex/ STI education   Annual labs: w/ PCP ANDRÉS [place]   Colonoscopy: patient desires cologard; order placed     2. Hot flashes due to post surgical menopause  Reviewed self-help strategies (dietary changes/exercise/accupuncture/etc.), herbal and other OTC therapies, hormonal options as well as other medications used to treat common menopausal symptoms. Layered clothing, fans.     Discussed increase hot flashes with alcohol and caffeine     Patient with vasomotor symptoms of menopause that have not responded to conservative measures    Reviewed the physiologic roles of estrogen, progesterone and androgens in the female both positive and negative    Explained that with menopause comes a dramatic reduction in these hormones and that changes take place in their absence    Discussed symptoms of decreased hormone levels and that these symptoms usually last 6 months to 2 years    Reviewed hormone replacement options as well as indications and contraindications    Discussed the WHI study findings and current FDA recommendations. Also discussed current recommendations for breast screening    Reviewed precautions when taking female hormones and symptoms to be aware of such as headache, visual change, focal weakness or numbness, SOB,chest pain, pain in thigh or calf, breast pain or lump, and vaginal bleeding. Instructed to call immediately and stop HRT if any of these symptoms occur    Discussed sexuality  Answered questions  Patient understands and wishes to  proceed with trial of low dose hormones   Rx: Estradiol IM injection   RTC 1 month f/u HRT     Previous medications:  IM Estrogen injections - dc'ed bc not available desires to restart @ [facility]     3. Breast  cancer screening by mammogram  Discussed recommendations of annual screening after age of 40 with mammogram and MRI for patients with lifetime risk greater than 25%       Explained that screening is not 100% reliable. Advised patient if she notices any changes to her breast including a lump, mass, dimpling, discharge, rash, or tenderness she should contact us immediately.       Recommend BSE monthly   MMG ordered     4. Tobacco user  Discussed harmful effects of tobacco and addictive characteristics     Advised cessation of use     Discussed with patient Chantix, bupropion, patch, or gum   Patient states understanding     5.Colon cancer screening  Educated  Discussed Colonoscopy vs Cologard  Patient desires cologard  Will send order       Future Appointments   Date Time Provider Department Center   8/1/2025 10:30 AM Nino Guthrie APRN [facility]   3/11/2026  8:20 AM LAB, [facility] LABORATORY DRAW STATION [facility] JONEL Rodríguez [facility]   3/23/2026  8:30 AM Nino Guthrie APRN JER RAIN Rodríguez [facility]     This note was transcribed by Karol Pérez. There may be transcription errors as a result, however minimal. Effort has been made to ensure accuracy of transcription, but any obvious errors or omissions should be clarified with the author of the document.              [1]   Current Outpatient Medications:     busPIRone (BUSPAR) 10 MG tablet, Take 1 tablet (10 mg total) by mouth 2 (two) times daily., Disp: 60 tablet, Rfl: 5    gabapentin (NEURONTIN) 300 MG capsule, TAKE 1 CAPSULE 3 TIMES DAILY, Disp: 90 capsule, Rfl: 0    naproxen (NAPROSYN) 500 MG tablet, TAKE 1 TABLET TWICE DAILY WITH FOOD FOR PAIN / INFLAMMATION, Disp: 60 tablet, Rfl: 0    tiZANidine (ZANAFLEX) 4 MG tablet, Take 1 tablet (4 mg total) by mouth every 8 (eight) hours., Disp: 30 tablet, Rfl: 3    DULoxetine (CYMBALTA) 60 MG capsule, Take 1 capsule (60 mg total) by mouth once daily., Disp: 90 capsule, Rfl: 1  [2]   Social History  Socioeconomic  History    Marital status:    Tobacco Use    Smoking status: Every Day     Current packs/day: 1.00     Average packs/day: 1 pack/day for 32.6 years (31.1 ttl pk-yrs)     Types: Cigarettes, Vaping with nicotine     Start date: 1993    Smokeless tobacco: Never   Substance and Sexual Activity    Alcohol use: Not Currently    Drug use: Never    Sexual activity: Not Currently     Birth control/protection: See Surgical Hx

## 2025-07-29 ENCOUNTER — OFFICE VISIT (OUTPATIENT)
Dept: OBSTETRICS AND GYNECOLOGY | Facility: CLINIC | Age: 50
End: 2025-07-29
Payer: MEDICAID

## 2025-07-29 VITALS
BODY MASS INDEX: 20.25 KG/M2 | DIASTOLIC BLOOD PRESSURE: 70 MMHG | HEIGHT: 66 IN | SYSTOLIC BLOOD PRESSURE: 112 MMHG | WEIGHT: 126 LBS

## 2025-07-29 DIAGNOSIS — N95.1 HOT FLASHES DUE TO MENOPAUSE: ICD-10-CM

## 2025-07-29 DIAGNOSIS — Z12.11 COLON CANCER SCREENING: ICD-10-CM

## 2025-07-29 DIAGNOSIS — Z72.0 TOBACCO USER: ICD-10-CM

## 2025-07-29 DIAGNOSIS — Z12.31 BREAST CANCER SCREENING BY MAMMOGRAM: ICD-10-CM

## 2025-07-29 DIAGNOSIS — Z01.419 WELL WOMAN EXAM: Primary | ICD-10-CM

## 2025-07-29 DIAGNOSIS — E89.41 SYMPTOMATIC POSTSURGICAL MENOPAUSE: ICD-10-CM

## 2025-07-29 PROCEDURE — 99396 PREV VISIT EST AGE 40-64: CPT | Mod: ,,, | Performed by: OBSTETRICS & GYNECOLOGY

## 2025-07-29 PROCEDURE — 1159F MED LIST DOCD IN RCRD: CPT | Mod: CPTII,,, | Performed by: OBSTETRICS & GYNECOLOGY

## 2025-07-29 PROCEDURE — 3078F DIAST BP <80 MM HG: CPT | Mod: CPTII,,, | Performed by: OBSTETRICS & GYNECOLOGY

## 2025-07-29 PROCEDURE — 99213 OFFICE O/P EST LOW 20 MIN: CPT | Mod: 25,,, | Performed by: OBSTETRICS & GYNECOLOGY

## 2025-07-29 PROCEDURE — 3044F HG A1C LEVEL LT 7.0%: CPT | Mod: CPTII,,, | Performed by: OBSTETRICS & GYNECOLOGY

## 2025-07-29 PROCEDURE — 3074F SYST BP LT 130 MM HG: CPT | Mod: CPTII,,, | Performed by: OBSTETRICS & GYNECOLOGY

## 2025-07-29 PROCEDURE — 3008F BODY MASS INDEX DOCD: CPT | Mod: CPTII,,, | Performed by: OBSTETRICS & GYNECOLOGY

## 2025-08-01 ENCOUNTER — OFFICE VISIT (OUTPATIENT)
Dept: FAMILY MEDICINE | Facility: CLINIC | Age: 50
End: 2025-08-01
Payer: MEDICAID

## 2025-08-01 VITALS
SYSTOLIC BLOOD PRESSURE: 100 MMHG | HEART RATE: 77 BPM | DIASTOLIC BLOOD PRESSURE: 68 MMHG | BODY MASS INDEX: 20.44 KG/M2 | HEIGHT: 66 IN | TEMPERATURE: 98 F | OXYGEN SATURATION: 95 % | WEIGHT: 127.19 LBS

## 2025-08-01 DIAGNOSIS — Z13.31 POSITIVE DEPRESSION SCREENING: ICD-10-CM

## 2025-08-01 DIAGNOSIS — F33.0 MILD EPISODE OF RECURRENT MAJOR DEPRESSIVE DISORDER: ICD-10-CM

## 2025-08-01 DIAGNOSIS — F41.9 ANXIETY: Primary | ICD-10-CM

## 2025-08-01 RX ORDER — DULOXETIN HYDROCHLORIDE 30 MG/1
30 CAPSULE, DELAYED RELEASE ORAL DAILY
Qty: 90 CAPSULE | Refills: 2 | Status: SHIPPED | OUTPATIENT
Start: 2025-08-01 | End: 2026-08-01

## 2025-08-01 RX ORDER — DULOXETIN HYDROCHLORIDE 60 MG/1
60 CAPSULE, DELAYED RELEASE ORAL DAILY
Qty: 90 CAPSULE | Refills: 2 | Status: SHIPPED | OUTPATIENT
Start: 2025-08-01 | End: 2026-08-01

## 2025-08-01 NOTE — ASSESSMENT & PLAN NOTE
Encouraged relaxation techniques such as yoga and deep breathing; daily exercise; and avoiding caffeine, alcohol and stimulants. Educated patient on the risks of serotonin based medications. Common side effects include nausea, GI upset, headache, dizziness. Educated on serotonin syndrome and on the need to call office or seek ER treatment if develops suicidal ideation and plan. Advised patient that benefits of the medication may not be noticeable for up to 6 weeks from start date. Patient states understanding.

## 2025-08-01 NOTE — PROGRESS NOTES
Patient ID: Jazmine Donovan  : 1975     Chief Complaint: Anxiety    Allergies: Patient has no known allergies.     History of Present Illness:  The patient is a 50 y.o. White female who presents to clinic for evaluation and management with a chief complaint of Anxiety   Patient is following up from changing Lexapro to Cymbalta for anxiety, depression, chronic pain.  Patient reports that she is tolerating well.  Denies any adverse effects.  Reports symptoms are improved but not at goal  and requests that medication dose be increased a little.  Patient also began hormone injections with Gynecology.  Reports this is helping with her sweating.    Generalized Anxiety Disorder 7-item (KUMAR-7) Scale. HOW OFTEN DURING THE PAST 2 WEEKS HAVE YOU FELT BOTHERED BY:  1. Feeling nervous, anxious, or on edge?: Not at all  2. Not being able to stop or control worrying?: Nearly everyday  3. Worrying too much about different things?: Not at all  4. Trouble relaxing?: Not at all  5. Being so restless that it is hard to sit still?: Not at all  6. Becoming easily annoyed or irritable?: Several days  7. Feeling afraid as if something awful might happen?: Not at all  8. If you checked off any problems, how difficult have these problems made it for you to do your work, take care of things at home, or get along with other people?: Not difficult at all  KUMAR-7 Score: 4  Number answered (out of first 7): 7  Interpretation: Normal        Depression Patient Health Questionnaire (PHQ-2)  Over the last two weeks how often have you been bothered by little interest or pleasure in doing things: Not at all  Over the last two weeks how often have you been bothered by feeling down, depressed or hopeless: Nearly every day  PHQ-2 Total Score: 3  Depression Patient Health Questionnaire (PHQ-9)  Over the last two weeks how often have you been bothered by little interest or pleasure in doing things: Not at all  Over the last two weeks how often  have you been bothered by feeling down, depressed or hopeless: Nearly every day  Over the last two weeks how often have you been bothered by trouble falling or staying asleep, or sleeping too much: Several days  Over the last two weeks how often have you been bothered by feeling tired or having little energy: Not at all  Over the last two weeks how often have you been bothered by a poor appetite or overeating: More than half the days  Over the last two weeks how often have you been bothered by feeling bad about yourself - or that you are a failure or have let yourself or your family down: Not at all  Over the last two weeks how often have you been bothered by trouble concentrating on things, such as reading the newspaper or watching television: Not at all  Over the last two weeks how often have you been bothered by moving or speaking so slowly that other people could have noticed. Or the opposite - being so fidgety or restless that you have been moving around a lot more than usual.: Not at all  Over the last two weeks how often have you been bothered by thoughts that you would be better off dead, or of hurting yourself: Not at all  If you checked off any problems, how difficult have these problems made it for you to do your work, take care of things at home or get along with other people?: Not difficult at all  PHQ-9 Score: 6  PHQ-9 Interpretation: Mild           Past Medical History:  has a past medical history of Degeneration of lumbar intervertebral disc, Elevated LDL cholesterol level, Hot flash, menopausal, Impacted cerumen, bilateral, and Neuropathy.    Social History:  reports that she has been smoking cigarettes and vaping with nicotine. She started smoking about 32 years ago. She has a 31.1 pack-year smoking history. She has never used smokeless tobacco. She reports that she does not currently use alcohol. She reports that she does not use drugs.    Care Team: Patient Care Team:  Nino Guthrie APRN as PCP -  "General (Family Medicine)  Abril Sawant MD as Consulting Physician (Obstetrics and Gynecology)     Current Medications:  Current Outpatient Medications   Medication Instructions    busPIRone (BUSPAR) 10 mg, Oral, 2 times daily    DULoxetine (CYMBALTA) 30 mg, Oral, Daily, Take with 60 mg for total daily dose of 90 mg    DULoxetine (CYMBALTA) 60 mg, Oral, Daily, Take with 30 mg for total daily dose of 90 mg    estradiol cypionate (DEPO-ESTRADIOL) 1 mg, Intramuscular, Every 28 days    gabapentin (NEURONTIN) 300 MG capsule TAKE 1 CAPSULE 3 TIMES DAILY    naproxen (NAPROSYN) 500 MG tablet TAKE 1 TABLET TWICE DAILY WITH FOOD FOR PAIN / INFLAMMATION    tiZANidine (ZANAFLEX) 4 mg, Oral, Every 8 hours       Review of Systems   See HPI    Visit Vitals  /68 (BP Location: Right arm, Patient Position: Sitting)   Pulse 77   Temp 98.2 °F (36.8 °C) (Temporal)   Ht 5' 5.98" (1.676 m)   Wt 57.7 kg (127 lb 3.2 oz)   SpO2 95%   BMI 20.54 kg/m²       Physical Exam  Vitals reviewed.   Constitutional:       General: She is not in acute distress.     Appearance: Normal appearance.   HENT:      Head: Normocephalic and atraumatic.      Nose: Nose normal.      Mouth/Throat:      Mouth: Mucous membranes are moist.      Pharynx: Oropharynx is clear.   Eyes:      Conjunctiva/sclera: Conjunctivae normal.   Cardiovascular:      Rate and Rhythm: Normal rate and regular rhythm.   Pulmonary:      Effort: Pulmonary effort is normal.      Breath sounds: Normal breath sounds.   Skin:     General: Skin is warm and dry.      Coloration: Skin is not jaundiced.      Findings: No rash.   Neurological:      Mental Status: She is alert and oriented to person, place, and time. Mental status is at baseline.   Psychiatric:         Mood and Affect: Mood normal.         Behavior: Behavior normal.          Labs Reviewed:  Chemistry:  Lab Results   Component Value Date     03/11/2025    K 5.1 03/11/2025    BUN 13 03/11/2025    CREATININE 0.81 " 03/11/2025    EGFRNORACEVR 89 03/11/2025    CALCIUM 9.4 03/11/2025    ALKPHOS 83 03/11/2025    ALBUMIN 3.9 03/11/2025    AST 30 03/11/2025    ALT 17 03/11/2025    TSH 2.510 03/18/2025    LRRCGR5AVWS 1.00 03/18/2025        Lab Results   Component Value Date    HGBA1C 5.3 03/11/2025        Hematology:  Lab Results   Component Value Date    WBC 5.43 03/11/2025    RBC 4.97 03/11/2025    HGB 15.1 03/11/2025    HCT 44.8 03/11/2025    MCV 90.1 03/11/2025    MCH 30.4 03/11/2025    MCHC 33.7 03/11/2025    RDW 12.7 03/11/2025     03/11/2025    MPV 12.8 (H) 03/11/2025       Lipid Panel:  Lab Results   Component Value Date    CHOL 181 03/11/2025    HDL 72 (H) 03/11/2025    LDLDIRECT 93.4 03/11/2025    TRIG 37 03/11/2025        Assessment & Plan:  1. Anxiety  Overview:  continue buspar 10mg BID    Orders:  -     DULoxetine (CYMBALTA) 30 MG capsule; Take 1 capsule (30 mg total) by mouth once daily. Take with 60 mg for total daily dose of 90 mg  Dispense: 90 capsule; Refill: 2  -     DULoxetine (CYMBALTA) 60 MG capsule; Take 1 capsule (60 mg total) by mouth once daily. Take with 30 mg for total daily dose of 90 mg  Dispense: 90 capsule; Refill: 2    2. Mild episode of recurrent major depressive disorder  Overview:  changed lexapro to cymbalta for depression, anxiety and chronic pain, mood improved but not at goal.  Increase to 90 mg daily    Assessment & Plan:  Encouraged relaxation techniques such as yoga and deep breathing; daily exercise; and avoiding caffeine, alcohol and stimulants. Educated patient on the risks of serotonin based medications. Common side effects include nausea, GI upset, headache, dizziness. Educated on serotonin syndrome and on the need to call office or seek ER treatment if develops suicidal ideation and plan. Advised patient that benefits of the medication may not be noticeable for up to 6 weeks from start date. Patient states understanding.       Orders:  -     DULoxetine (CYMBALTA) 30 MG capsule;  Take 1 capsule (30 mg total) by mouth once daily. Take with 60 mg for total daily dose of 90 mg  Dispense: 90 capsule; Refill: 2  -     DULoxetine (CYMBALTA) 60 MG capsule; Take 1 capsule (60 mg total) by mouth once daily. Take with 30 mg for total daily dose of 90 mg  Dispense: 90 capsule; Refill: 2    3. Positive depression screening  Comments:  I have reviewed the positive depression score which warrants active treatment with psychotherapy and/or medications.         Future Appointments   Date Time Provider Department Center   8/28/2025  8:40 AM Abril Sawant MD [facility]   9/11/2025  7:00 AM Nino Guthrie APRN [facility]   3/11/2026  8:20 AM LAB, [facility] LABORATORY DRAW STATION [facility]   3/23/2026  8:30 [facility]       Follow up for 6 wk f/u anxiety . Call sooner if needed.    ANDRÉS BASURTO        Lab Frequency Next Occurrence   Ambulatory referral/consult to Physical/Occupational Therapy Once 09/23/2024   Mammo Digital Screening Bilat w/ Stan (XPD) Once 03/18/2025   CBC Auto Differential Once 03/18/2026   Comprehensive Metabolic Panel Once 03/18/2026   Lipid Panel Once 03/18/2026   TSH Once 03/18/2026   Hemoglobin A1C Once 03/18/2026   Complete PFT with bronchodilator Once 05/08/2025   CT Chest Lung Screening Low Dose Once 05/01/2025        I have used clinical judgement based on duration and functional status to consider definite necessity for treatment.

## 2025-08-13 ENCOUNTER — TELEPHONE (OUTPATIENT)
Dept: OBSTETRICS AND GYNECOLOGY | Facility: CLINIC | Age: 50
End: 2025-08-13
Payer: MEDICAID

## 2025-08-20 DIAGNOSIS — F33.0 MILD EPISODE OF RECURRENT MAJOR DEPRESSIVE DISORDER: ICD-10-CM

## 2025-08-20 DIAGNOSIS — F41.9 ANXIETY: ICD-10-CM

## 2025-08-20 DIAGNOSIS — M51.369 DEGENERATION OF INTERVERTEBRAL DISC OF LUMBAR REGION: ICD-10-CM

## 2025-08-20 RX ORDER — NAPROXEN 500 MG/1
500 TABLET ORAL 2 TIMES DAILY WITH MEALS
Qty: 60 TABLET | Refills: 0 | Status: SHIPPED | OUTPATIENT
Start: 2025-08-20

## 2025-08-20 RX ORDER — DULOXETIN HYDROCHLORIDE 30 MG/1
30 CAPSULE, DELAYED RELEASE ORAL DAILY
Qty: 90 CAPSULE | Refills: 0 | Status: SHIPPED | OUTPATIENT
Start: 2025-08-20 | End: 2026-08-20

## 2025-08-28 ENCOUNTER — OFFICE VISIT (OUTPATIENT)
Dept: OBSTETRICS AND GYNECOLOGY | Facility: CLINIC | Age: 50
End: 2025-08-28
Payer: MEDICAID

## 2025-08-28 VITALS
TEMPERATURE: 98 F | BODY MASS INDEX: 20.83 KG/M2 | WEIGHT: 125 LBS | DIASTOLIC BLOOD PRESSURE: 64 MMHG | HEIGHT: 65 IN | SYSTOLIC BLOOD PRESSURE: 112 MMHG

## 2025-08-28 DIAGNOSIS — N95.1 HOT FLASHES DUE TO MENOPAUSE: Primary | ICD-10-CM

## 2025-08-28 PROCEDURE — 1159F MED LIST DOCD IN RCRD: CPT | Mod: CPTII,,, | Performed by: OBSTETRICS & GYNECOLOGY

## 2025-08-28 PROCEDURE — 3008F BODY MASS INDEX DOCD: CPT | Mod: CPTII,,, | Performed by: OBSTETRICS & GYNECOLOGY

## 2025-08-28 PROCEDURE — 3078F DIAST BP <80 MM HG: CPT | Mod: CPTII,,, | Performed by: OBSTETRICS & GYNECOLOGY

## 2025-08-28 PROCEDURE — 3044F HG A1C LEVEL LT 7.0%: CPT | Mod: CPTII,,, | Performed by: OBSTETRICS & GYNECOLOGY

## 2025-08-28 PROCEDURE — 99213 OFFICE O/P EST LOW 20 MIN: CPT | Mod: ,,, | Performed by: OBSTETRICS & GYNECOLOGY

## 2025-08-28 PROCEDURE — 3074F SYST BP LT 130 MM HG: CPT | Mod: CPTII,,, | Performed by: OBSTETRICS & GYNECOLOGY

## 2025-08-28 RX ORDER — ESTRADIOL 0.03 MG/D
1 PATCH TRANSDERMAL
Qty: 4 PATCH | Refills: 11 | Status: SHIPPED | OUTPATIENT
Start: 2025-08-28